# Patient Record
Sex: MALE | Race: WHITE | NOT HISPANIC OR LATINO | Employment: FULL TIME | URBAN - NONMETROPOLITAN AREA
[De-identification: names, ages, dates, MRNs, and addresses within clinical notes are randomized per-mention and may not be internally consistent; named-entity substitution may affect disease eponyms.]

---

## 2023-04-20 PROBLEM — R09.81 NASAL CONGESTION: Status: ACTIVE | Noted: 2023-04-20

## 2023-04-20 PROBLEM — M79.10 MYALGIA: Status: ACTIVE | Noted: 2023-04-20

## 2023-04-20 PROBLEM — M79.641 PAIN OF RIGHT HAND: Status: ACTIVE | Noted: 2023-04-20

## 2023-04-20 PROBLEM — R35.1 NOCTURIA: Status: ACTIVE | Noted: 2023-04-20

## 2023-04-20 PROBLEM — R19.5 POSITIVE COLORECTAL CANCER SCREENING USING COLOGUARD TEST: Status: ACTIVE | Noted: 2023-04-20

## 2023-04-20 PROBLEM — R20.0 NUMBNESS: Status: ACTIVE | Noted: 2023-04-20

## 2023-04-20 PROBLEM — K21.9 GERD (GASTROESOPHAGEAL REFLUX DISEASE): Status: ACTIVE | Noted: 2023-04-20

## 2023-04-20 PROBLEM — R53.83 FATIGUE: Status: ACTIVE | Noted: 2023-04-20

## 2023-04-20 PROBLEM — G47.33 OBSTRUCTIVE SLEEP APNEA: Status: ACTIVE | Noted: 2023-04-20

## 2023-04-20 PROBLEM — M79.641 PAIN IN BOTH HANDS: Status: ACTIVE | Noted: 2023-04-20

## 2023-04-20 PROBLEM — E78.2 HYPERLIPEMIA, MIXED: Status: ACTIVE | Noted: 2023-04-20

## 2023-04-20 PROBLEM — M79.642 PAIN IN BOTH HANDS: Status: ACTIVE | Noted: 2023-04-20

## 2023-04-20 PROBLEM — H69.93 DYSFUNCTION OF BOTH EUSTACHIAN TUBES: Status: ACTIVE | Noted: 2023-04-20

## 2023-04-20 PROBLEM — R91.8 LUNG NODULES: Status: ACTIVE | Noted: 2023-04-20

## 2023-04-20 RX ORDER — MULTIVITAMIN
1 TABLET ORAL DAILY
COMMUNITY
Start: 2022-03-22

## 2023-04-20 RX ORDER — PANTOPRAZOLE SODIUM 40 MG/1
1 TABLET, DELAYED RELEASE ORAL DAILY
COMMUNITY
Start: 2022-03-22 | End: 2023-06-12 | Stop reason: SDUPTHER

## 2023-04-20 RX ORDER — FLUTICASONE PROPIONATE 50 MCG
2 SPRAY, SUSPENSION (ML) NASAL DAILY
COMMUNITY
Start: 2022-06-27 | End: 2024-02-19 | Stop reason: WASHOUT

## 2023-04-21 ENCOUNTER — TELEPHONE (OUTPATIENT)
Dept: PRIMARY CARE | Facility: CLINIC | Age: 53
End: 2023-04-21

## 2023-04-21 ENCOUNTER — LAB (OUTPATIENT)
Dept: LAB | Facility: LAB | Age: 53
End: 2023-04-21
Payer: COMMERCIAL

## 2023-04-21 ENCOUNTER — OFFICE VISIT (OUTPATIENT)
Dept: PRIMARY CARE | Facility: CLINIC | Age: 53
End: 2023-04-21
Payer: COMMERCIAL

## 2023-04-21 VITALS
WEIGHT: 207.4 LBS | HEIGHT: 74 IN | DIASTOLIC BLOOD PRESSURE: 80 MMHG | OXYGEN SATURATION: 95 % | HEART RATE: 84 BPM | SYSTOLIC BLOOD PRESSURE: 110 MMHG | BODY MASS INDEX: 26.62 KG/M2

## 2023-04-21 DIAGNOSIS — R53.83 FATIGUE, UNSPECIFIED TYPE: ICD-10-CM

## 2023-04-21 DIAGNOSIS — R00.2 PALPITATIONS: ICD-10-CM

## 2023-04-21 DIAGNOSIS — K21.9 GASTROESOPHAGEAL REFLUX DISEASE WITHOUT ESOPHAGITIS: Primary | ICD-10-CM

## 2023-04-21 DIAGNOSIS — Z12.5 SCREENING FOR PROSTATE CANCER: ICD-10-CM

## 2023-04-21 DIAGNOSIS — R07.9 CHEST PAIN, UNSPECIFIED TYPE: ICD-10-CM

## 2023-04-21 DIAGNOSIS — G47.33 OBSTRUCTIVE SLEEP APNEA: ICD-10-CM

## 2023-04-21 DIAGNOSIS — E78.2 HYPERLIPEMIA, MIXED: ICD-10-CM

## 2023-04-21 PROBLEM — R20.0 NUMBNESS: Status: RESOLVED | Noted: 2023-04-20 | Resolved: 2023-04-21

## 2023-04-21 PROBLEM — M79.10 MYALGIA: Status: RESOLVED | Noted: 2023-04-20 | Resolved: 2023-04-21

## 2023-04-21 PROBLEM — M79.641 PAIN IN BOTH HANDS: Status: RESOLVED | Noted: 2023-04-20 | Resolved: 2023-04-21

## 2023-04-21 PROBLEM — M79.641 PAIN OF RIGHT HAND: Status: RESOLVED | Noted: 2023-04-20 | Resolved: 2023-04-21

## 2023-04-21 PROBLEM — M79.642 PAIN IN BOTH HANDS: Status: RESOLVED | Noted: 2023-04-20 | Resolved: 2023-04-21

## 2023-04-21 LAB
ALANINE AMINOTRANSFERASE (SGPT) (U/L) IN SER/PLAS: 18 U/L (ref 10–52)
ALBUMIN (G/DL) IN SER/PLAS: 4.5 G/DL (ref 3.4–5)
ALKALINE PHOSPHATASE (U/L) IN SER/PLAS: 85 U/L (ref 33–120)
ANION GAP IN SER/PLAS: 12 MMOL/L (ref 10–20)
ASPARTATE AMINOTRANSFERASE (SGOT) (U/L) IN SER/PLAS: 18 U/L (ref 9–39)
BILIRUBIN TOTAL (MG/DL) IN SER/PLAS: 0.9 MG/DL (ref 0–1.2)
CALCIUM (MG/DL) IN SER/PLAS: 9.3 MG/DL (ref 8.6–10.3)
CARBON DIOXIDE, TOTAL (MMOL/L) IN SER/PLAS: 25 MMOL/L (ref 21–32)
CHLORIDE (MMOL/L) IN SER/PLAS: 107 MMOL/L (ref 98–107)
CHOLESTEROL (MG/DL) IN SER/PLAS: 191 MG/DL (ref 0–199)
CHOLESTEROL IN HDL (MG/DL) IN SER/PLAS: 44 MG/DL
CHOLESTEROL/HDL RATIO: 4.3
COBALAMIN (VITAMIN B12) (PG/ML) IN SER/PLAS: 476 PG/ML (ref 211–911)
CREATININE (MG/DL) IN SER/PLAS: 1.06 MG/DL (ref 0.5–1.3)
ERYTHROCYTE DISTRIBUTION WIDTH (RATIO) BY AUTOMATED COUNT: 12.5 % (ref 11.5–14.5)
ERYTHROCYTE MEAN CORPUSCULAR HEMOGLOBIN CONCENTRATION (G/DL) BY AUTOMATED: 34.2 G/DL (ref 32–36)
ERYTHROCYTE MEAN CORPUSCULAR VOLUME (FL) BY AUTOMATED COUNT: 90 FL (ref 80–100)
ERYTHROCYTES (10*6/UL) IN BLOOD BY AUTOMATED COUNT: 5.32 X10E12/L (ref 4.5–5.9)
GFR MALE: 84 ML/MIN/1.73M2
GLUCOSE (MG/DL) IN SER/PLAS: 103 MG/DL (ref 74–99)
HEMATOCRIT (%) IN BLOOD BY AUTOMATED COUNT: 48 % (ref 41–52)
HEMOGLOBIN (G/DL) IN BLOOD: 16.4 G/DL (ref 13.5–17.5)
LDL: 128 MG/DL (ref 0–99)
LEUKOCYTES (10*3/UL) IN BLOOD BY AUTOMATED COUNT: 5.1 X10E9/L (ref 4.4–11.3)
PLATELETS (10*3/UL) IN BLOOD AUTOMATED COUNT: 216 X10E9/L (ref 150–450)
POTASSIUM (MMOL/L) IN SER/PLAS: 4.2 MMOL/L (ref 3.5–5.3)
PROSTATE SPECIFIC ANTIGEN,SCREEN: 0.86 NG/ML (ref 0–4)
PROTEIN TOTAL: 7.1 G/DL (ref 6.4–8.2)
SODIUM (MMOL/L) IN SER/PLAS: 140 MMOL/L (ref 136–145)
THYROTROPIN (MIU/L) IN SER/PLAS BY DETECTION LIMIT <= 0.05 MIU/L: 1.97 MIU/L (ref 0.44–3.98)
TRIGLYCERIDE (MG/DL) IN SER/PLAS: 96 MG/DL (ref 0–149)
UREA NITROGEN (MG/DL) IN SER/PLAS: 20 MG/DL (ref 6–23)
VLDL: 19 MG/DL (ref 0–40)

## 2023-04-21 PROCEDURE — 80053 COMPREHEN METABOLIC PANEL: CPT

## 2023-04-21 PROCEDURE — 84153 ASSAY OF PSA TOTAL: CPT

## 2023-04-21 PROCEDURE — 85027 COMPLETE CBC AUTOMATED: CPT

## 2023-04-21 PROCEDURE — 99214 OFFICE O/P EST MOD 30 MIN: CPT | Performed by: FAMILY MEDICINE

## 2023-04-21 PROCEDURE — 80061 LIPID PANEL: CPT

## 2023-04-21 PROCEDURE — 36415 COLL VENOUS BLD VENIPUNCTURE: CPT

## 2023-04-21 PROCEDURE — 82607 VITAMIN B-12: CPT

## 2023-04-21 PROCEDURE — 84443 ASSAY THYROID STIM HORMONE: CPT

## 2023-04-21 ASSESSMENT — ENCOUNTER SYMPTOMS
VOMITING: 0
ABDOMINAL DISTENTION: 0
SHORTNESS OF BREATH: 0
ACTIVITY CHANGE: 0
PALPITATIONS: 1
FATIGUE: 1
ARTHRALGIAS: 0
ADENOPATHY: 0
APPETITE CHANGE: 0
WHEEZING: 0
NUMBNESS: 0
ABDOMINAL PAIN: 0
HEADACHES: 0
UNEXPECTED WEIGHT CHANGE: 0
DIZZINESS: 0
RHINORRHEA: 0
CHEST TIGHTNESS: 1
NAUSEA: 0
DIARRHEA: 0
TROUBLE SWALLOWING: 0
DIFFICULTY URINATING: 0
COUGH: 0
CONSTIPATION: 0
LIGHT-HEADEDNESS: 1

## 2023-04-21 NOTE — ASSESSMENT & PLAN NOTE
Pulse rate is normal today, noticing intermittent palpitations, will check a stress test given the patient's intermittent chest pressure and lightheadedness and decreased exercise tolerance.  Also check blood testing today.

## 2023-04-21 NOTE — ASSESSMENT & PLAN NOTE
Atypical chest pain, patient is very concerned about cardiac risk, CT cardiac calcium score done a few years ago was 0, patient is concerned about intermittent blood pressure readings being elevated, and change in exercise tolerance, increasing fatigue, intermittent shortness of breath and lightheadedness.  We will check a treadmill Cardiolite, check blood testing today, recheck in the next month, contact the office if symptoms worsen.

## 2023-04-21 NOTE — PROGRESS NOTES
"Subjective   Patient ID: Smith Gray II is a 53 y.o. male who presents for Hypertension.    HPI   Taking PPI daily without breakthrough symptoms.  Reviewed dietary, caffeine, tobacco, alcohol, and NSAID use.  No dyspepsia, dysphagia, reflux, melena, or abdominal pain.  Pressure in ears and eyes at times  No headache, chest pain (has had some pressure in chest), shortness of breath, dizziness, lightheadedness (at times has noticed some edema)  One and a half week more pressure in chest, some fluttering in chest at times, some change in exercise tolerance  Checked BP at [facility] in the past week, noticed elevated BP  Feels pulse in neck and pressure behind eyes  Has checked some home BPs (below 140/90)  Patient has been using their CPAP nightly and is experiencing restful sleep, no morning headaches, no witnessed snoring, and notices a difference if they do not use the device.      Review of Systems   Constitutional:  Positive for fatigue. Negative for activity change, appetite change and unexpected weight change.   HENT:  Negative for ear pain, nosebleeds, rhinorrhea, sneezing and trouble swallowing.    Respiratory:  Positive for chest tightness. Negative for cough, shortness of breath and wheezing.    Cardiovascular:  Positive for chest pain and palpitations. Negative for leg swelling.   Gastrointestinal:  Negative for abdominal distention, abdominal pain, constipation, diarrhea, nausea and vomiting.   Genitourinary:  Negative for difficulty urinating.   Musculoskeletal:  Negative for arthralgias.   Skin:  Negative for rash.   Neurological:  Positive for light-headedness. Negative for dizziness, numbness and headaches.   Hematological:  Negative for adenopathy.   Psychiatric/Behavioral:  Negative for behavioral problems.    All other systems reviewed and are negative.      Objective   /80   Pulse 84   Ht 1.88 m (6' 2\")   Wt 94.1 kg (207 lb 6.4 oz)   SpO2 95%   BMI 26.63 kg/m²     Physical Exam  Vitals " and nursing note reviewed.   Constitutional:       General: He is not in acute distress.     Appearance: Normal appearance. He is not toxic-appearing.   HENT:      Head: Normocephalic and atraumatic.      Right Ear: Tympanic membrane, ear canal and external ear normal.      Left Ear: Tympanic membrane, ear canal and external ear normal.      Nose: Nose normal.      Mouth/Throat:      Mouth: Mucous membranes are moist.      Pharynx: Oropharynx is clear.   Eyes:      Extraocular Movements: Extraocular movements intact.      Conjunctiva/sclera: Conjunctivae normal.      Pupils: Pupils are equal, round, and reactive to light.   Cardiovascular:      Rate and Rhythm: Normal rate and regular rhythm.   Pulmonary:      Effort: Pulmonary effort is normal.      Breath sounds: Normal breath sounds.   Abdominal:      General: Abdomen is flat. Bowel sounds are normal.      Palpations: Abdomen is soft.   Musculoskeletal:      Cervical back: Normal range of motion and neck supple.   Skin:     General: Skin is warm and dry.      Capillary Refill: Capillary refill takes less than 2 seconds.   Neurological:      General: No focal deficit present.      Mental Status: He is alert and oriented to person, place, and time. Mental status is at baseline.   Psychiatric:         Mood and Affect: Mood normal.         Behavior: Behavior normal.         Assessment/Plan   Problem List Items Addressed This Visit          Nervous    Obstructive sleep apnea     Compliant with CPAP therapy.            Circulatory    Palpitations     Pulse rate is normal today, noticing intermittent palpitations, will check a stress test given the patient's intermittent chest pressure and lightheadedness and decreased exercise tolerance.  Also check blood testing today.         Relevant Orders    CBC    Comprehensive Metabolic Panel    Thyroid Stimulating Hormone    Nuclear Stress Test       Digestive    GERD (gastroesophageal reflux disease) - Primary     Currently  stable without symptoms on medication.            Other    Fatigue     Check blood testing today.         Relevant Orders    Follow Up In Primary Care    CBC    Comprehensive Metabolic Panel    Thyroid Stimulating Hormone    Vitamin B12    Hyperlipemia, mixed     Advised about diabetic blood testing today.         Relevant Orders    Comprehensive Metabolic Panel    Lipid Panel    Chest pain     Atypical chest pain, patient is very concerned about cardiac risk, CT cardiac calcium score done a few years ago was 0, patient is concerned about intermittent blood pressure readings being elevated, and change in exercise tolerance, increasing fatigue, intermittent shortness of breath and lightheadedness.  We will check a treadmill Cardiolite, check blood testing today, recheck in the next month, contact the office if symptoms worsen.         Relevant Orders    CBC    Comprehensive Metabolic Panel    Nuclear Stress Test     Other Visit Diagnoses       Screening for prostate cancer        Relevant Orders    Prostate Specific Antigen, Screen

## 2023-04-21 NOTE — PATIENT INSTRUCTIONS
Home blood pressure goal is less than 140/90, check a couple times a week, be rested for 20 minutes before checking

## 2023-04-24 ENCOUNTER — DOCUMENTATION (OUTPATIENT)
Dept: PRIMARY CARE | Facility: CLINIC | Age: 53
End: 2023-04-24
Payer: COMMERCIAL

## 2023-05-01 ENCOUNTER — TELEPHONE (OUTPATIENT)
Dept: PRIMARY CARE | Facility: CLINIC | Age: 53
End: 2023-05-01
Payer: COMMERCIAL

## 2023-05-01 DIAGNOSIS — R94.39 ABNORMAL CARDIOVASCULAR STRESS TEST: Primary | ICD-10-CM

## 2023-05-01 NOTE — TELEPHONE ENCOUNTER
SCHEDULED PATIENT THRU [facility] FOR WED 5/24/23 AT 8:45 FOR CARDIOLOGY.  PATIENT GIVEN PREAPPT. INSTRUCTIONS.

## 2023-05-22 ENCOUNTER — APPOINTMENT (OUTPATIENT)
Dept: PRIMARY CARE | Facility: CLINIC | Age: 53
End: 2023-05-22
Payer: COMMERCIAL

## 2023-06-12 ENCOUNTER — OFFICE VISIT (OUTPATIENT)
Dept: PRIMARY CARE | Facility: CLINIC | Age: 53
End: 2023-06-12
Payer: COMMERCIAL

## 2023-06-12 VITALS
HEIGHT: 74 IN | WEIGHT: 210.6 LBS | HEART RATE: 69 BPM | OXYGEN SATURATION: 98 % | BODY MASS INDEX: 27.03 KG/M2 | DIASTOLIC BLOOD PRESSURE: 80 MMHG | SYSTOLIC BLOOD PRESSURE: 130 MMHG

## 2023-06-12 DIAGNOSIS — Z12.5 PROSTATE CANCER SCREENING: ICD-10-CM

## 2023-06-12 DIAGNOSIS — I71.20 THORACIC AORTIC ANEURYSM WITHOUT RUPTURE, UNSPECIFIED PART (CMS-HCC): ICD-10-CM

## 2023-06-12 DIAGNOSIS — E78.2 HYPERLIPEMIA, MIXED: ICD-10-CM

## 2023-06-12 DIAGNOSIS — R53.83 FATIGUE, UNSPECIFIED TYPE: ICD-10-CM

## 2023-06-12 DIAGNOSIS — K21.9 GASTROESOPHAGEAL REFLUX DISEASE WITHOUT ESOPHAGITIS: ICD-10-CM

## 2023-06-12 DIAGNOSIS — R00.2 PALPITATIONS: Primary | ICD-10-CM

## 2023-06-12 DIAGNOSIS — R91.8 LUNG NODULES: ICD-10-CM

## 2023-06-12 PROCEDURE — 99396 PREV VISIT EST AGE 40-64: CPT | Performed by: FAMILY MEDICINE

## 2023-06-12 RX ORDER — PANTOPRAZOLE SODIUM 40 MG/1
40 TABLET, DELAYED RELEASE ORAL DAILY
Qty: 90 TABLET | Refills: 3 | Status: SHIPPED | OUTPATIENT
Start: 2023-06-12 | End: 2024-06-11

## 2023-06-12 ASSESSMENT — ENCOUNTER SYMPTOMS
VOMITING: 0
RHINORRHEA: 0
ARTHRALGIAS: 0
ADENOPATHY: 0
CONSTIPATION: 0
ACTIVITY CHANGE: 0
UNEXPECTED WEIGHT CHANGE: 0
ABDOMINAL DISTENTION: 0
FATIGUE: 0
NAUSEA: 0
TROUBLE SWALLOWING: 0
PALPITATIONS: 0
LIGHT-HEADEDNESS: 0
NUMBNESS: 0
ABDOMINAL PAIN: 0
DIZZINESS: 0
WHEEZING: 0
HEADACHES: 0
APPETITE CHANGE: 0
COUGH: 0
DIARRHEA: 0
SHORTNESS OF BREATH: 0
DIFFICULTY URINATING: 0

## 2023-06-12 NOTE — ASSESSMENT & PLAN NOTE
Not really bothering the patient anymore, normal stress testing echocardiogram, follow-up as needed.

## 2023-06-12 NOTE — ASSESSMENT & PLAN NOTE
Found on most recent CT scan that was ordered by cardiology, echocardiogram done recently reveals a 3.2 cm aneurysm of the ascending aorta, CT scan done over a year ago shows similar finding.  We will plan to recheck this again in 1 year, patient is currently asymptomatic.

## 2023-06-12 NOTE — PROGRESS NOTES
"Subjective   Patient ID: Speedy Gray II is a 53 y.o. male who presents for Annual Exam.    HPI   No headache, chest pain, shortness of breath, dizziness, lightheadedness, or edema  Taking PPI daily without breakthrough symptoms.  Reviewed dietary, caffeine, tobacco, alcohol, and NSAID use.  No dyspepsia, dysphagia, reflux, melena, or abdominal pain.  No palpitations (can come and go over a rare period)  HBP less than 140/90  + active and exercise regularly      Review of Systems   Constitutional:  Negative for activity change, appetite change, fatigue and unexpected weight change.   HENT:  Negative for ear pain, nosebleeds, rhinorrhea, sneezing and trouble swallowing.    Respiratory:  Negative for cough, shortness of breath and wheezing.    Cardiovascular:  Negative for chest pain, palpitations and leg swelling.   Gastrointestinal:  Negative for abdominal distention, abdominal pain, constipation, diarrhea, nausea and vomiting.   Genitourinary:  Negative for difficulty urinating.   Musculoskeletal:  Negative for arthralgias.   Skin:  Negative for rash.   Neurological:  Negative for dizziness, light-headedness, numbness and headaches.   Hematological:  Negative for adenopathy.   Psychiatric/Behavioral:  Negative for behavioral problems.    All other systems reviewed and are negative.      Objective   /80   Pulse 69   Ht 1.88 m (6' 2\")   Wt 95.5 kg (210 lb 9.6 oz)   SpO2 98%   BMI 27.04 kg/m²     Physical Exam  Vitals and nursing note reviewed.   Constitutional:       General: He is not in acute distress.     Appearance: Normal appearance. He is not toxic-appearing.   HENT:      Head: Normocephalic and atraumatic.      Right Ear: Tympanic membrane, ear canal and external ear normal.      Left Ear: Tympanic membrane, ear canal and external ear normal.      Nose: Nose normal.      Mouth/Throat:      Mouth: Mucous membranes are moist.      Pharynx: Oropharynx is clear.   Eyes:      Extraocular Movements: " Extraocular movements intact.      Conjunctiva/sclera: Conjunctivae normal.      Pupils: Pupils are equal, round, and reactive to light.   Cardiovascular:      Rate and Rhythm: Normal rate and regular rhythm.   Pulmonary:      Effort: Pulmonary effort is normal.      Breath sounds: Normal breath sounds.   Abdominal:      General: Abdomen is flat. Bowel sounds are normal.      Palpations: Abdomen is soft.   Musculoskeletal:      Cervical back: Normal range of motion and neck supple.   Skin:     General: Skin is warm and dry.      Capillary Refill: Capillary refill takes less than 2 seconds.   Neurological:      General: No focal deficit present.      Mental Status: He is alert and oriented to person, place, and time. Mental status is at baseline.   Psychiatric:         Mood and Affect: Mood normal.         Behavior: Behavior normal.         Assessment/Plan   Problem List Items Addressed This Visit          Respiratory    Lung nodules     Nodules are stable for the past 3 years, most likely benign.            Circulatory    Palpitations - Primary     Not really bothering the patient anymore, normal stress testing echocardiogram, follow-up as needed.         Relevant Orders    Follow Up In Primary Care    Thoracic aortic aneurysm without rupture (CMS/HCC)     Found on most recent CT scan that was ordered by cardiology, echocardiogram done recently reveals a 3.2 cm aneurysm of the ascending aorta, CT scan done over a year ago shows similar finding.  We will plan to recheck this again in 1 year, patient is currently asymptomatic.            Digestive    GERD (gastroesophageal reflux disease)     Currently stable no change.         Relevant Medications    pantoprazole (ProtoNix) 40 mg EC tablet    Other Relevant Orders    Follow Up In Primary Care       Other    Fatigue    Relevant Orders    Follow Up In Primary Care    Hyperlipemia, mixed     Mild elevation in LDL cholesterol, CT cardiac calcium score ordered by  cardiology reveals a score in the LAD of only 3.  Patient very active, advised again about diet and exercise, no change with medication follow-up again in 1 year.         Relevant Orders    Follow Up In Primary Care    Comprehensive Metabolic Panel    Lipid Panel     Other Visit Diagnoses       Prostate cancer screening        Relevant Orders    Follow Up In Primary Care    Prostate Specific Antigen, Screen

## 2023-06-12 NOTE — ASSESSMENT & PLAN NOTE
Mild elevation in LDL cholesterol, CT cardiac calcium score ordered by cardiology reveals a score in the LAD of only 3.  Patient very active, advised again about diet and exercise, no change with medication follow-up again in 1 year.

## 2023-10-31 ENCOUNTER — TELEPHONE (OUTPATIENT)
Dept: PRIMARY CARE | Facility: CLINIC | Age: 53
End: 2023-10-31

## 2023-10-31 ENCOUNTER — OFFICE VISIT (OUTPATIENT)
Dept: PRIMARY CARE | Facility: CLINIC | Age: 53
End: 2023-10-31
Payer: COMMERCIAL

## 2023-10-31 ENCOUNTER — LAB (OUTPATIENT)
Dept: LAB | Facility: LAB | Age: 53
End: 2023-10-31
Payer: COMMERCIAL

## 2023-10-31 VITALS
OXYGEN SATURATION: 99 % | BODY MASS INDEX: 27.58 KG/M2 | SYSTOLIC BLOOD PRESSURE: 110 MMHG | HEART RATE: 82 BPM | WEIGHT: 208.1 LBS | HEIGHT: 73 IN | DIASTOLIC BLOOD PRESSURE: 80 MMHG

## 2023-10-31 DIAGNOSIS — R51.9 NONINTRACTABLE HEADACHE, UNSPECIFIED CHRONICITY PATTERN, UNSPECIFIED HEADACHE TYPE: ICD-10-CM

## 2023-10-31 DIAGNOSIS — R42 DIZZINESS: ICD-10-CM

## 2023-10-31 DIAGNOSIS — R51.9 NONINTRACTABLE HEADACHE, UNSPECIFIED CHRONICITY PATTERN, UNSPECIFIED HEADACHE TYPE: Primary | ICD-10-CM

## 2023-10-31 LAB
ALBUMIN SERPL BCP-MCNC: 4.5 G/DL (ref 3.4–5)
ALP SERPL-CCNC: 76 U/L (ref 33–120)
ALT SERPL W P-5'-P-CCNC: 20 U/L (ref 10–52)
ANION GAP SERPL CALC-SCNC: 9 MMOL/L (ref 10–20)
AST SERPL W P-5'-P-CCNC: 19 U/L (ref 9–39)
BASOPHILS # BLD AUTO: 0.06 X10*3/UL (ref 0–0.1)
BASOPHILS NFR BLD AUTO: 1.1 %
BILIRUB SERPL-MCNC: 0.6 MG/DL (ref 0–1.2)
BUN SERPL-MCNC: 18 MG/DL (ref 6–23)
CALCIUM SERPL-MCNC: 9.3 MG/DL (ref 8.6–10.3)
CHLORIDE SERPL-SCNC: 104 MMOL/L (ref 98–107)
CO2 SERPL-SCNC: 30 MMOL/L (ref 21–32)
CREAT SERPL-MCNC: 0.99 MG/DL (ref 0.5–1.3)
CRP SERPL-MCNC: 0.22 MG/DL
EOSINOPHIL # BLD AUTO: 0.21 X10*3/UL (ref 0–0.7)
EOSINOPHIL NFR BLD AUTO: 3.9 %
ERYTHROCYTE [DISTWIDTH] IN BLOOD BY AUTOMATED COUNT: 12.5 % (ref 11.5–14.5)
ERYTHROCYTE [SEDIMENTATION RATE] IN BLOOD BY WESTERGREN METHOD: 3 MM/H (ref 0–20)
GFR SERPL CREATININE-BSD FRML MDRD: >90 ML/MIN/1.73M*2
GLUCOSE SERPL-MCNC: 99 MG/DL (ref 74–99)
HCT VFR BLD AUTO: 47.6 % (ref 41–52)
HGB BLD-MCNC: 16.2 G/DL (ref 13.5–17.5)
IMM GRANULOCYTES # BLD AUTO: 0.02 X10*3/UL (ref 0–0.7)
IMM GRANULOCYTES NFR BLD AUTO: 0.4 % (ref 0–0.9)
LYMPHOCYTES # BLD AUTO: 1.49 X10*3/UL (ref 1.2–4.8)
LYMPHOCYTES NFR BLD AUTO: 27.6 %
MCH RBC QN AUTO: 31.2 PG (ref 26–34)
MCHC RBC AUTO-ENTMCNC: 34 G/DL (ref 32–36)
MCV RBC AUTO: 92 FL (ref 80–100)
MONOCYTES # BLD AUTO: 0.46 X10*3/UL (ref 0.1–1)
MONOCYTES NFR BLD AUTO: 8.5 %
NEUTROPHILS # BLD AUTO: 3.15 X10*3/UL (ref 1.2–7.7)
NEUTROPHILS NFR BLD AUTO: 58.5 %
NRBC BLD-RTO: 0 /100 WBCS (ref 0–0)
PLATELET # BLD AUTO: 211 X10*3/UL (ref 150–450)
PMV BLD AUTO: 10.6 FL (ref 7.5–11.5)
POTASSIUM SERPL-SCNC: 4 MMOL/L (ref 3.5–5.3)
PROT SERPL-MCNC: 7.3 G/DL (ref 6.4–8.2)
RBC # BLD AUTO: 5.19 X10*6/UL (ref 4.5–5.9)
SODIUM SERPL-SCNC: 139 MMOL/L (ref 136–145)
WBC # BLD AUTO: 5.4 X10*3/UL (ref 4.4–11.3)

## 2023-10-31 PROCEDURE — 86140 C-REACTIVE PROTEIN: CPT

## 2023-10-31 PROCEDURE — 99213 OFFICE O/P EST LOW 20 MIN: CPT | Performed by: FAMILY MEDICINE

## 2023-10-31 PROCEDURE — 80053 COMPREHEN METABOLIC PANEL: CPT

## 2023-10-31 PROCEDURE — 85652 RBC SED RATE AUTOMATED: CPT

## 2023-10-31 PROCEDURE — 85025 COMPLETE CBC W/AUTO DIFF WBC: CPT

## 2023-10-31 PROCEDURE — 36415 COLL VENOUS BLD VENIPUNCTURE: CPT

## 2023-10-31 ASSESSMENT — ENCOUNTER SYMPTOMS
DIZZINESS: 1
ABDOMINAL DISTENTION: 0
CHILLS: 0
PALPITATIONS: 0
HEADACHES: 1
LIGHT-HEADEDNESS: 1
RHINORRHEA: 0
SPEECH DIFFICULTY: 0
NUMBNESS: 0
ARTHRALGIAS: 0
COUGH: 0
DIARRHEA: 0
SLEEP DISTURBANCE: 1
CONSTIPATION: 0
FEVER: 0
DIFFICULTY URINATING: 0
DECREASED CONCENTRATION: 0
VOMITING: 0
ACTIVITY CHANGE: 0
DYSPHORIC MOOD: 0
UNEXPECTED WEIGHT CHANGE: 0
WHEEZING: 0
WEAKNESS: 0
FATIGUE: 0
ABDOMINAL PAIN: 0
TROUBLE SWALLOWING: 0
ADENOPATHY: 0
SHORTNESS OF BREATH: 0
NERVOUS/ANXIOUS: 0
APPETITE CHANGE: 0
NAUSEA: 0

## 2023-10-31 NOTE — PROGRESS NOTES
"Subjective   Patient ID: Speedy Gray II is a 53 y.o. male who presents for Lt Headedness, Mild MILLS.    HPI   4 weeks ago noticed on a trip to [place], had a head rush, felt like might pass out, did not pass out  Has had headaches since, not disabling, radiates to ears, comes and goes, worse if sedentary, no worse with activity or exertion, hard to focus vision at times, LH at times, no issues with aphasia, some trouble focusing, no dizziness, hard to fall asleep at times, no change in emotions  More fatigued and tired than normal    Review of Systems   Constitutional:  Negative for activity change, appetite change, chills, fatigue, fever and unexpected weight change.   HENT:  Negative for ear pain, nosebleeds, rhinorrhea, sneezing and trouble swallowing.    Respiratory:  Negative for cough, shortness of breath and wheezing.    Cardiovascular:  Negative for chest pain, palpitations and leg swelling.   Gastrointestinal:  Negative for abdominal distention, abdominal pain, constipation, diarrhea, nausea and vomiting.   Genitourinary:  Negative for difficulty urinating.   Musculoskeletal:  Negative for arthralgias.   Skin:  Negative for rash.   Neurological:  Positive for dizziness, light-headedness and headaches. Negative for syncope, speech difficulty, weakness and numbness.   Hematological:  Negative for adenopathy.   Psychiatric/Behavioral:  Positive for sleep disturbance. Negative for behavioral problems, decreased concentration and dysphoric mood. The patient is not nervous/anxious.    All other systems reviewed and are negative.      Objective   /80   Pulse 82   Ht 1.854 m (6' 1\")   Wt 94.4 kg (208 lb 1.6 oz)   SpO2 99%   BMI 27.46 kg/m²     Physical Exam  Vitals and nursing note reviewed.   Constitutional:       General: He is not in acute distress.     Appearance: Normal appearance. He is not toxic-appearing.   HENT:      Head: Normocephalic and atraumatic.      Right Ear: Tympanic membrane, ear canal " and external ear normal.      Left Ear: Tympanic membrane, ear canal and external ear normal.      Nose: Nose normal.      Mouth/Throat:      Mouth: Mucous membranes are moist.      Pharynx: Oropharynx is clear.   Eyes:      Extraocular Movements: Extraocular movements intact.      Conjunctiva/sclera: Conjunctivae normal.      Pupils: Pupils are equal, round, and reactive to light.   Cardiovascular:      Rate and Rhythm: Normal rate and regular rhythm.      Pulses: Normal pulses.      Heart sounds: Normal heart sounds.   Pulmonary:      Effort: Pulmonary effort is normal.      Breath sounds: Normal breath sounds.   Abdominal:      General: Abdomen is flat. Bowel sounds are normal.      Palpations: Abdomen is soft.   Musculoskeletal:      Cervical back: Normal range of motion and neck supple.   Skin:     General: Skin is warm and dry.      Capillary Refill: Capillary refill takes less than 2 seconds.   Neurological:      General: No focal deficit present.      Mental Status: He is alert and oriented to person, place, and time. Mental status is at baseline.      Cranial Nerves: No cranial nerve deficit.      Sensory: No sensory deficit.      Motor: No weakness.      Coordination: Coordination normal.      Gait: Gait normal.      Deep Tendon Reflexes: Reflexes normal.   Psychiatric:         Mood and Affect: Mood normal.         Behavior: Behavior normal.         Assessment/Plan   Problem List Items Addressed This Visit    None  Visit Diagnoses         Codes    Nonintractable headache, unspecified chronicity pattern, unspecified headache type    -  Primary R51.9    Check labs and CT of the head.     Relevant Orders    Follow Up In Primary Care - Established    CBC and Auto Differential    Comprehensive Metabolic Panel    Sedimentation Rate    C-Reactive Protein    CT head wo IV contrast    Dizziness     R42    Start by checking labs, recheck in 2 weeks.     Relevant Orders    Follow Up In Primary Care - Established     CBC and Auto Differential    Comprehensive Metabolic Panel    Sedimentation Rate    C-Reactive Protein    CT head wo IV contrast

## 2023-11-08 ENCOUNTER — HOSPITAL ENCOUNTER (OUTPATIENT)
Dept: RADIOLOGY | Facility: HOSPITAL | Age: 53
Discharge: HOME | End: 2023-11-08
Payer: COMMERCIAL

## 2023-11-08 DIAGNOSIS — R51.9 NONINTRACTABLE HEADACHE, UNSPECIFIED CHRONICITY PATTERN, UNSPECIFIED HEADACHE TYPE: ICD-10-CM

## 2023-11-08 DIAGNOSIS — R42 DIZZINESS: ICD-10-CM

## 2023-11-08 PROCEDURE — 70450 CT HEAD/BRAIN W/O DYE: CPT | Performed by: STUDENT IN AN ORGANIZED HEALTH CARE EDUCATION/TRAINING PROGRAM

## 2023-11-08 PROCEDURE — 70450 CT HEAD/BRAIN W/O DYE: CPT

## 2023-11-16 ENCOUNTER — OFFICE VISIT (OUTPATIENT)
Dept: PRIMARY CARE | Facility: CLINIC | Age: 53
End: 2023-11-16
Payer: COMMERCIAL

## 2023-11-16 ENCOUNTER — TELEPHONE (OUTPATIENT)
Dept: PRIMARY CARE | Facility: CLINIC | Age: 53
End: 2023-11-16

## 2023-11-16 VITALS
HEART RATE: 73 BPM | OXYGEN SATURATION: 98 % | HEIGHT: 73 IN | BODY MASS INDEX: 27.67 KG/M2 | SYSTOLIC BLOOD PRESSURE: 110 MMHG | WEIGHT: 208.8 LBS | DIASTOLIC BLOOD PRESSURE: 80 MMHG

## 2023-11-16 DIAGNOSIS — R51.9 NONINTRACTABLE HEADACHE, UNSPECIFIED CHRONICITY PATTERN, UNSPECIFIED HEADACHE TYPE: Primary | ICD-10-CM

## 2023-11-16 DIAGNOSIS — R42 DIZZINESS: ICD-10-CM

## 2023-11-16 DIAGNOSIS — K21.9 GASTROESOPHAGEAL REFLUX DISEASE WITHOUT ESOPHAGITIS: ICD-10-CM

## 2023-11-16 PROCEDURE — 99213 OFFICE O/P EST LOW 20 MIN: CPT | Performed by: FAMILY MEDICINE

## 2023-11-16 RX ORDER — SERTRALINE HYDROCHLORIDE 25 MG/1
25 TABLET, FILM COATED ORAL DAILY
Qty: 30 TABLET | Refills: 11 | Status: SHIPPED | OUTPATIENT
Start: 2023-11-16 | End: 2024-05-30 | Stop reason: ALTCHOICE

## 2023-11-16 ASSESSMENT — ENCOUNTER SYMPTOMS
TROUBLE SWALLOWING: 0
WHEEZING: 0
NAUSEA: 0
NERVOUS/ANXIOUS: 1
HEADACHES: 1
DIZZINESS: 0
UNEXPECTED WEIGHT CHANGE: 0
LIGHT-HEADEDNESS: 1
ABDOMINAL DISTENTION: 0
DYSPHORIC MOOD: 1
FATIGUE: 1
DIFFICULTY URINATING: 0
COUGH: 0
ARTHRALGIAS: 0
ADENOPATHY: 0
VOMITING: 0
SHORTNESS OF BREATH: 0
NUMBNESS: 0
RHINORRHEA: 0
ABDOMINAL PAIN: 0
PALPITATIONS: 0
APPETITE CHANGE: 0
ACTIVITY CHANGE: 0
CONSTIPATION: 0
DIARRHEA: 0
SLEEP DISTURBANCE: 0

## 2023-11-16 NOTE — PROGRESS NOTES
"Subjective   Patient ID: Speedy Gray II is a 53 y.o. male who presents for 2 wk F/U.    HPI   Taking PPI daily without breakthrough symptoms.  Reviewed dietary, caffeine, tobacco, alcohol, and NSAID use.  No dyspepsia, dysphagia, reflux, melena, or abdominal pain.  Random headaches, pain in ears, face and posterior head at times  Sleeping OK, rested in AM, no daytime somnolence  Occ dizziness, gets a head rush at times  No palpitations  Dizziness lasts seconds to a minute, rush in head, no syncope or vertigo  No vision, smell or hearing issues  Uses some NSAIDS through the week  Has noticed new over the past few months, has not gotten worse    Review of Systems   Constitutional:  Positive for fatigue. Negative for activity change, appetite change and unexpected weight change.   HENT:  Negative for ear pain, nosebleeds, rhinorrhea, sneezing and trouble swallowing.    Respiratory:  Negative for cough, shortness of breath and wheezing.    Cardiovascular:  Negative for chest pain, palpitations and leg swelling.   Gastrointestinal:  Negative for abdominal distention, abdominal pain, constipation, diarrhea, nausea and vomiting.   Genitourinary:  Negative for difficulty urinating.   Musculoskeletal:  Negative for arthralgias.   Skin:  Negative for rash.   Neurological:  Positive for light-headedness and headaches. Negative for dizziness and numbness.   Hematological:  Negative for adenopathy.   Psychiatric/Behavioral:  Positive for dysphoric mood. Negative for behavioral problems and sleep disturbance. The patient is nervous/anxious.    All other systems reviewed and are negative.      Objective   /80   Pulse 73   Ht 1.854 m (6' 1\")   Wt 94.7 kg (208 lb 12.8 oz)   SpO2 98%   BMI 27.55 kg/m²     Physical Exam  Vitals and nursing note reviewed.   Constitutional:       Appearance: Normal appearance.   HENT:      Head: Normocephalic and atraumatic.      Right Ear: Tympanic membrane, ear canal and external ear " normal.      Left Ear: Tympanic membrane, ear canal and external ear normal.      Nose: Nose normal.      Mouth/Throat:      Mouth: Mucous membranes are moist.      Pharynx: Oropharynx is clear.   Cardiovascular:      Rate and Rhythm: Normal rate and regular rhythm.      Pulses: Normal pulses.      Heart sounds: Normal heart sounds.   Pulmonary:      Effort: Pulmonary effort is normal.      Breath sounds: Normal breath sounds.   Musculoskeletal:      Cervical back: Normal range of motion and neck supple.   Neurological:      General: No focal deficit present.      Mental Status: He is alert and oriented to person, place, and time. Mental status is at baseline.      Cranial Nerves: No cranial nerve deficit.      Sensory: No sensory deficit.      Motor: No weakness.      Coordination: Coordination normal.      Gait: Gait normal.      Deep Tendon Reflexes: Reflexes normal.   Psychiatric:         Mood and Affect: Mood normal.         Behavior: Behavior normal.         Assessment/Plan   Problem List Items Addressed This Visit             ICD-10-CM    GERD (gastroesophageal reflux disease) K21.9     Other Visit Diagnoses         Codes    Nonintractable headache, unspecified chronicity pattern, unspecified headache type    -  Primary R51.9    Consultation with neurology, will try low-dose sertraline 25 mg a day, contact the office if gets worse or changes.     Relevant Medications    sertraline (Zoloft) 25 mg tablet    Other Relevant Orders    Referral to Neurology    Dizziness     R42    Get neurology consultation.  May need MRI scan of the head     Relevant Medications    sertraline (Zoloft) 25 mg tablet    Other Relevant Orders    Referral to Neurology

## 2024-02-19 ENCOUNTER — OFFICE VISIT (OUTPATIENT)
Dept: NEUROLOGY | Facility: CLINIC | Age: 54
End: 2024-02-19
Payer: COMMERCIAL

## 2024-02-19 VITALS
WEIGHT: 206 LBS | SYSTOLIC BLOOD PRESSURE: 138 MMHG | HEIGHT: 73 IN | HEART RATE: 73 BPM | DIASTOLIC BLOOD PRESSURE: 84 MMHG | BODY MASS INDEX: 27.3 KG/M2

## 2024-02-19 DIAGNOSIS — R51.9 NEW ONSET OF HEADACHES AFTER AGE 50: Primary | ICD-10-CM

## 2024-02-19 DIAGNOSIS — R13.10 DYSPHAGIA, UNSPECIFIED TYPE: ICD-10-CM

## 2024-02-19 DIAGNOSIS — R51.9 NONINTRACTABLE HEADACHE, UNSPECIFIED CHRONICITY PATTERN, UNSPECIFIED HEADACHE TYPE: ICD-10-CM

## 2024-02-19 DIAGNOSIS — R42 DIZZINESS: ICD-10-CM

## 2024-02-19 PROCEDURE — 99204 OFFICE O/P NEW MOD 45 MIN: CPT | Performed by: PSYCHIATRY & NEUROLOGY

## 2024-02-19 ASSESSMENT — ENCOUNTER SYMPTOMS
POLYDIPSIA: 0
COUGH: 0
SHORTNESS OF BREATH: 0
WOUND: 0
DIAPHORESIS: 0
AGITATION: 0
ARTHRALGIAS: 0
EYE DISCHARGE: 0
DIARRHEA: 0
NERVOUS/ANXIOUS: 1
ADENOPATHY: 0
DIFFICULTY URINATING: 0
FEVER: 0
CONSTIPATION: 0
CHILLS: 0
FATIGUE: 0
BACK PAIN: 0

## 2024-02-19 ASSESSMENT — VISUAL ACUITY: VA_NORMAL: 1

## 2024-02-19 NOTE — PROGRESS NOTES
"Consults    History Of Present Illness  Smith Gray II \"Speedy\" is a 53 y.o. 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  His weight is stable.    Past Medical and Surgical History  Active Ambulatory Problems     Diagnosis Date Noted    Dysfunction of both eustachian tubes 04/20/2023    Fatigue 04/20/2023    GERD (gastroesophageal reflux disease) 04/20/2023    Hyperlipemia, mixed 04/20/2023    Lung nodules 04/20/2023    Nasal congestion 04/20/2023    Nocturia 04/20/2023    Obstructive sleep apnea 04/20/2023    Positive colorectal cancer screening using Cologuard test 04/20/2023    Chest pain 04/21/2023    Palpitations 04/21/2023    Thoracic aortic aneurysm without rupture (CMS/HCC) 06/12/2023     Resolved Ambulatory Problems     Diagnosis Date Noted    Myalgia 04/20/2023    Numbness 04/20/2023    " Pain in both hands 04/20/2023    Pain of right hand 04/20/2023     No Additional Past Medical History       Past Surgical History:   Procedure Laterality Date    OTHER SURGICAL HISTORY  12/21/2020    Back surgery    OTHER SURGICAL HISTORY  12/21/2020    Knee arthroscopy    OTHER SURGICAL HISTORY  12/21/2020    Interior tooth extraction    OTHER SURGICAL HISTORY  04/26/2022    Colonoscopy        Social History  Social History     Tobacco Use    Smoking status: Former     Types: Cigars    Smokeless tobacco: Never   Vaping Use    Vaping Use: Never used   Substance Use Topics    Alcohol use: Yes     Comment: occ    Drug use: Never     Allergies  Patient has no known allergies.  (Not in a hospital admission)      Review of Systems   Constitutional:  Negative for chills, diaphoresis, fatigue and fever.   HENT:  Negative for congestion.    Eyes:  Negative for discharge.   Respiratory:  Negative for cough and shortness of breath.    Cardiovascular:  Negative for chest pain.   Gastrointestinal:  Negative for constipation and diarrhea.   Endocrine: Negative for polydipsia.   Genitourinary:  Negative for difficulty urinating.   Musculoskeletal:  Negative for arthralgias and back pain.   Skin:  Negative for pallor and wound.   Allergic/Immunologic: Negative for environmental allergies.   Hematological:  Negative for adenopathy.   Psychiatric/Behavioral:  Negative for agitation and behavioral problems. The patient is nervous/anxious.          Cardiovascular system: S1-S2 intact  Respiratory clear to auscultation bilateral on deep breathing he feels irritability on the left side of the chest.    Neurological Exam  Mental Status  Awake, alert and oriented to person, place and time. Recent and remote memory are intact. Able to copy figure. Clock drawing is normal. Speech is normal. Able to name objects, name parts of objects, repeat, read and write. Follows three-step commands. Able to perform serial calculations. Able to spell words  "backwards. Fund of knowledge is appropriate for level of education.    Cranial Nerves  CN II: Visual acuity is normal. Right funduscopic exam: disc intact. Left funduscopic exam: disc intact.  CN III, IV, VI: Extraocular movements intact bilaterally. Normal lids and orbits bilaterally.   Right pupil: Round. Reactive to light. Reactive to accommodation.  CN V:  Right: Facial sensation is normal.  Left: Facial sensation is normal on the left.  CN VII: Full and symmetric facial movement.  CN VIII:  Right: Hearing is normal.  Left: Hearing is normal.  CN IX, X:  Right: Palate is normal.  Left: Palate is normal.  CN XI:  Right: Sternocleidomastoid strength is normal.  Left: Sternocleidomastoid strength is normal.  CN XII: Tongue midline without atrophy or fasciculations.    Motor  Normal muscle bulk throughout. No fasciculations present. Normal muscle tone. No abnormal involuntary movements. Strength is 5/5 throughout all four extremities.    Sensory  Light touch is normal in upper and lower extremities. Pinprick is normal in upper and lower extremities. Temperature is normal in upper and lower extremities. Vibration is normal in upper and lower extremities.     Reflexes  Deep tendon reflexes are 2+ and symmetric in all four extremities.  Jaw jerk absent.  Right pathological reflexes: Shivani's absent.  Left pathological reflexes: Shivani's absent.  Glabellar tap absent. Snout absent. Right palmomental absent. Left palmomental absent. Right palmar grasp absent. Left palmar grasp absent.    Coordination  Right: Finger-to-nose normal. Rapid alternating movement normal. Heel-to-shin normal.Left: Finger-to-nose normal. Rapid alternating movement normal. Heel-to-shin normal.    Gait  Normal casual, toe, heel and tandem gait.        Last Recorded Vitals  Blood pressure 138/84, pulse 73, height 1.854 m (6' 1\"), weight 93.4 kg (206 lb).    Relevant Results      Current Outpatient Medications:     multivitamin tablet, Take 1 " tablet by mouth once daily., Disp: , Rfl:     pantoprazole (ProtoNix) 40 mg EC tablet, Take 1 tablet (40 mg) by mouth once daily., Disp: 90 tablet, Rfl: 3    sertraline (Zoloft) 25 mg tablet, Take 1 tablet (25 mg) by mouth once daily. (Patient taking differently: Take 1 tablet (25 mg) by mouth if needed.), Disp: 30 tablet, Rfl: 11     Continuous medications    PRN medications, reviewed                                        I have personally reviewed the following imaging for brain (CT/ MR) and results and discussed in detail with the patient/care giver/family     No results found.     Imaging Brain/Head  No results found for this or any previous visit.      Imaging Cervical  No results found for this or any previous visit.    Imaging Thoracic  No results found for this or any previous visit.    Imaging Lumbar  No results found for this or any previous visit.      Assessment/Plan   Intermittent dysphagia rule out aspiration with barium swallow  Rule out myopathy CK aldolase, rule out myasthenia gravis acetylcholine receptor antibodies panel his son is diagnosed with ocular myasthenia and he did very well with prednisone and he has been asymptomatic and his son is in the teenage.    No conduction EMG testing to rule out myopathy or findings of ALS     Patient/Family Education: Extensive time was spent educating the patient on relevant anatomy, clinical findings and imaging, as well as discussing the potential diagnoses as discussed above.  Pharmacology: as above. Exercise: I discussed the importance of maintaining a daily exercise program, including stretching and strengthening. Preventative strategies were reviewed, specifically avoidance of any exercises that exacerbate pain.Return to online virtual visit/ clinic visit for follow-up with me in 2 months or sooner as needed.The patient expressed understanding and agreement with the assessment and plan.  Patient encouraged to contact us should they have any questions,  concerns, or any changes in symptoms. Thank you for allowing me to participate in the care of your patient.** This note is created using speech recognition transcription software. Despite proofreading, several typographical errors might be present that might affect the meaning of the content. Please call with any questions.**          Kiet Velazquez MD

## 2024-02-27 ENCOUNTER — HOSPITAL ENCOUNTER (OUTPATIENT)
Dept: NEUROLOGY | Facility: CLINIC | Age: 54
Discharge: HOME | End: 2024-02-27
Payer: COMMERCIAL

## 2024-02-27 DIAGNOSIS — R13.10 DYSPHAGIA, UNSPECIFIED TYPE: ICD-10-CM

## 2024-02-27 PROCEDURE — 95912 NRV CNDJ TEST 11-12 STUDIES: CPT | Performed by: PSYCHIATRY & NEUROLOGY

## 2024-02-27 PROCEDURE — 95886 MUSC TEST DONE W/N TEST COMP: CPT | Performed by: PSYCHIATRY & NEUROLOGY

## 2024-02-27 PROCEDURE — 95887 MUSC TST DONE W/N TST NONEXT: CPT | Performed by: PSYCHIATRY & NEUROLOGY

## 2024-02-27 PROCEDURE — 95887 MUSC TST DONE W/N TST NONEXT: CPT

## 2024-02-27 PROCEDURE — 95867 NDL EMG CRANIAL NRV MUSC UNI: CPT | Performed by: PSYCHIATRY & NEUROLOGY

## 2024-03-06 ENCOUNTER — HOSPITAL ENCOUNTER (OUTPATIENT)
Dept: RADIOLOGY | Facility: HOSPITAL | Age: 54
Discharge: HOME | End: 2024-03-06
Payer: COMMERCIAL

## 2024-03-06 DIAGNOSIS — R13.10 DYSPHAGIA, UNSPECIFIED TYPE: ICD-10-CM

## 2024-03-06 DIAGNOSIS — R51.9 NONINTRACTABLE HEADACHE, UNSPECIFIED CHRONICITY PATTERN, UNSPECIFIED HEADACHE TYPE: ICD-10-CM

## 2024-03-06 DIAGNOSIS — R51.9 NEW ONSET OF HEADACHES AFTER AGE 50: ICD-10-CM

## 2024-03-06 PROCEDURE — 70551 MRI BRAIN STEM W/O DYE: CPT

## 2024-03-06 PROCEDURE — 70551 MRI BRAIN STEM W/O DYE: CPT | Performed by: RADIOLOGY

## 2024-03-08 ENCOUNTER — HOSPITAL ENCOUNTER (OUTPATIENT)
Dept: RADIOLOGY | Facility: HOSPITAL | Age: 54
Discharge: HOME | End: 2024-03-08
Payer: COMMERCIAL

## 2024-03-08 DIAGNOSIS — R13.10 DYSPHAGIA, UNSPECIFIED TYPE: ICD-10-CM

## 2024-03-08 DIAGNOSIS — R13.19 ESOPHAGEAL DYSPHAGIA: Primary | ICD-10-CM

## 2024-03-08 PROCEDURE — 2500000001 HC RX 250 WO HCPCS SELF ADMINISTERED DRUGS (ALT 637 FOR MEDICARE OP): Performed by: PSYCHIATRY & NEUROLOGY

## 2024-03-08 PROCEDURE — 3430000001 HC RX 343 DIAGNOSTIC RADIOPHARMACEUTICALS: Performed by: PSYCHIATRY & NEUROLOGY

## 2024-03-08 PROCEDURE — 74230 X-RAY XM SWLNG FUNCJ C+: CPT | Performed by: RADIOLOGY

## 2024-03-08 PROCEDURE — 74230 X-RAY XM SWLNG FUNCJ C+: CPT

## 2024-03-08 PROCEDURE — 92611 MOTION FLUOROSCOPY/SWALLOW: CPT | Mod: GN | Performed by: SPEECH-LANGUAGE PATHOLOGIST

## 2024-03-08 RX ADMIN — BARIUM SULFATE 20 ML: 400 PASTE ORAL at 08:56

## 2024-03-08 RX ADMIN — BARIUM SULFATE 110 ML: 960 POWDER, FOR SUSPENSION ORAL at 08:54

## 2024-03-08 RX ADMIN — BARIUM SULFATE 10 ML: 400 SUSPENSION ORAL at 08:55

## 2024-03-08 RX ADMIN — BARIUM SULFATE 90 ML: 400 SUSPENSION ORAL at 08:54

## 2024-03-08 NOTE — PROCEDURES
"Speech-Language Pathology      Modified Barium Swallow Study     Patient Name: Smith Gray II \"Speedy\"  MRN: [id number]  : 1970  Today's Date: 24      Start Time: 8:30 am  End Time: 8:55 am  Total Time: 25 minutes    Recommendations:  Diet: Regular texture diet and thin liquids  Compensatory Strategies: Seated upright during intake and for 30 minutes after, alternate liquids and solids, moisten dry foods, small bites/sips, effortful swallow, double swallow with solids  Follow up: Consider GI follow up     Assessment/Impression:    Full detailed SLP/Radiologist Modified Barium Swallow study report can be found under Chart Review tab, Imaging tab and  titled \"[place] Modified Barium Swallow Study\"      Pt. Presenting with mild esophageal dysphagia noted by globus sensation, slowing of motility, residue collection near UES and LES, and esophageal residue with solids.    Plan:  SLP Plan: No treatment needs identified at this time     Discussed POC: Patient, Nursing   Discussed Risks/Benefits: Yes  Patient/Caregiver Agreeable: Yes    Pain:   Rating 0-10: 0  Location: NA     Education:   Pt. Educated on results of MBS study, recommended diet and recommended safe swallow strategies   "

## 2024-04-10 ENCOUNTER — APPOINTMENT (OUTPATIENT)
Dept: NEUROLOGY | Facility: CLINIC | Age: 54
End: 2024-04-10
Payer: COMMERCIAL

## 2024-05-29 NOTE — PROGRESS NOTES
Cardiology Subsequent Encounter Clinic Note  Name: Smith Gray II  MRN: [id number]  : 1970    CC: Atypical chest pain    Active Issues:  Smith Gray II is a 54 y.o. male with a medical history of hyperlipidemia here to establish care regarding the following:     Abnormal stress test:  -Findings that are possibly artifactual noted on stress test May 2023  -Exercised for 10 minutes 20 seconds; no electrocardiographic changes consistent with ischemia  -CT calcium score performed 2021 was 0     Currently the patient notes occasional episodes of chest discomfort that have been ongoing for the past 2-4 years. He describes them as being randomly triggered; no clear relationship to exertion. Can last for minutes to hours at a time. No relationship to palpation. Occasionally change with position and respiration. Denies any exertional dyspnea. Does note occasional fatigue. No orthopnea/PND/lower extremity edema.       Past Medical History  No past medical history on file.    Past Surgical History  Past Surgical History:   Procedure Laterality Date    OTHER SURGICAL HISTORY  2020    Back surgery    OTHER SURGICAL HISTORY  2020    Knee arthroscopy    OTHER SURGICAL HISTORY  2020    [place] tooth extraction    OTHER SURGICAL HISTORY  2022    Colonoscopy       Medications  Current Outpatient Medications on File Prior to Visit   Medication Sig Dispense Refill    multivitamin tablet Take 1 tablet by mouth once daily.      pantoprazole (ProtoNix) 40 mg EC tablet Take 1 tablet (40 mg) by mouth once daily. 90 tablet 3    sertraline (Zoloft) 25 mg tablet Take 1 tablet (25 mg) by mouth once daily. (Patient not taking: Reported on 2024) 30 tablet 11     No current facility-administered medications on file prior to visit.       Allergies  No Known Allergies    Social History  Social History     Tobacco Use    Smoking status: Former     Types: Cigars    Smokeless tobacco: Never   Vaping Use     "Vaping status: Never Used   Substance Use Topics    Alcohol use: Yes     Comment: occ    Drug use: Never       Family History  Family History   Problem Relation Name Age of Onset    ALS Mother      No Known Problems Father         Physical Examination  Vitals: /74   Pulse 63   Ht 1.854 m (6' 1\")   Wt 94.3 kg (208 lb)   SpO2 99%   BMI 27.44 kg/m²   General: awake, alert and oriented. No acute distress.   Skin: Skin is warm, dry and intact without rashes or lesions. Appropriate color for ethnicity. Nail beds pink with no cyanosis or clubbing  HEENT: normocephalic, atraumatic; conjunctivae are clear without exudates or hemorrhage. Sclera is non-icteric. Eyelids are normal in appearance without swelling or lesions. Hearing intact. Nares are patent bilaterally. Moist mucous membranes.   Cardiovascular: Regular. No murmurs, gallops, or rubs are auscultated. S1 and S2 are heard and are of normal intensity. No JVD, no carotid bruits  Respiratory: Thorax symmetric. CTAB, breath sounds vesicular. No crackles, wheezes or ronchi.   Gastrointestinal: soft, non-distended, BS + x 4  Genitourinary: exam deferred  Musculoskeletal: moves all extremities  Extremities: pulses palpable bilaterally; no swelling or erythema; no edema  Neurological: alert & oriented x 3; no focal deficits  Psychiatric: appropriate mood and affect       Labs/Imaging/Procedures    Lab Results   Component Value Date    HGB 16.2 10/31/2023    HGB 16.4 04/21/2023    HGB 15.7 04/29/2021    HGB 16.6 12/22/2020     10/31/2023    WBC 5.4 10/31/2023     10/31/2023    K 4.0 10/31/2023    CREATININE 0.99 10/31/2023    CREATININE 1.06 04/21/2023    CREATININE 1.16 03/22/2022    CREATININE 1.08 04/29/2021    BUN 18 10/31/2023    CALCIUM 9.3 10/31/2023    LDLF 128 (H) 04/21/2023     No echocardiogram results found for the past 12 months  EMG & nerve conduction  Impression:    This is a normal study. There is no electrophysiologic evidence of motor " neuron disease. In addition, there was no electrophysiologic evidence of myopathy, motor neuropathy or right cervical or lumbar radiculopathy.    Pamela Waldron M.D.,Ph.D.    --------------------------------------------------------------------------------------------------------------------------------------------------------------------------------------------------------------------------------------------------------------  -----------      Impression  Smith Gray II is a 54 y.o. male with a medical history of hyperlipidemia here to establish care regarding the following:     Abnormal stress test:  -Findings that are possibly artifactual noted on stress test May 2023  -Exercised for 10 minutes 20 seconds; no electrocardiographic changes consistent with ischemia  -CT calcium score performed June 2021 was 0     Plan:  -Patient symptoms do not sound cardiac in etiology; his chest discomfort sounds noncardiac. The negative stress test; and the low CT calcium score is particularly reassuring.   -Notably he has a mildly ectatic aortic root on his echocardiogram (3.9 cm).  Serial CT calcium scores have also commented on a ectatic aorta; however these were done without contrast and the dimensions have remained relatively constant.  I do not think regular surveillance is warranted at this time.  Blood pressure at goal.  No family history of aneurysms.    RTC PRN    Trell Velazquez MD  Advanced Heart Failure/Transplant Cardiology  Cardio-Oncology  [facility]

## 2024-05-30 ENCOUNTER — OFFICE VISIT (OUTPATIENT)
Dept: CARDIOLOGY | Facility: CLINIC | Age: 54
End: 2024-05-30
Payer: COMMERCIAL

## 2024-05-30 ENCOUNTER — OFFICE VISIT (OUTPATIENT)
Dept: NEUROLOGY | Facility: CLINIC | Age: 54
End: 2024-05-30
Payer: COMMERCIAL

## 2024-05-30 VITALS
WEIGHT: 206.6 LBS | BODY MASS INDEX: 27.38 KG/M2 | DIASTOLIC BLOOD PRESSURE: 82 MMHG | HEART RATE: 76 BPM | HEIGHT: 73 IN | SYSTOLIC BLOOD PRESSURE: 120 MMHG

## 2024-05-30 VITALS
HEIGHT: 73 IN | HEART RATE: 63 BPM | SYSTOLIC BLOOD PRESSURE: 120 MMHG | WEIGHT: 208 LBS | OXYGEN SATURATION: 99 % | DIASTOLIC BLOOD PRESSURE: 74 MMHG | BODY MASS INDEX: 27.57 KG/M2

## 2024-05-30 DIAGNOSIS — R94.39 ABNORMAL STRESS TEST: Primary | ICD-10-CM

## 2024-05-30 DIAGNOSIS — K21.00 GASTROESOPHAGEAL REFLUX DISEASE WITH ESOPHAGITIS WITHOUT HEMORRHAGE: Primary | ICD-10-CM

## 2024-05-30 DIAGNOSIS — R07.89 ATYPICAL CHEST PAIN: ICD-10-CM

## 2024-05-30 DIAGNOSIS — E78.2 HYPERLIPEMIA, MIXED: ICD-10-CM

## 2024-05-30 PROCEDURE — 93000 ELECTROCARDIOGRAM COMPLETE: CPT | Performed by: STUDENT IN AN ORGANIZED HEALTH CARE EDUCATION/TRAINING PROGRAM

## 2024-05-30 PROCEDURE — 99214 OFFICE O/P EST MOD 30 MIN: CPT | Performed by: PSYCHIATRY & NEUROLOGY

## 2024-05-30 PROCEDURE — 99214 OFFICE O/P EST MOD 30 MIN: CPT | Performed by: STUDENT IN AN ORGANIZED HEALTH CARE EDUCATION/TRAINING PROGRAM

## 2024-05-30 ASSESSMENT — VISUAL ACUITY: VA_NORMAL: 1

## 2024-05-30 NOTE — PROGRESS NOTES
"Smith Gray II \"Speedy\" is a 54 y.o. male with follow up   No new issues. Discussed Ba swallow and its vidoes and he has mild delay to pass pill at the LES. Need more to discuss with GI. He has the habit o taking late night snacks and GERD since early adult millan.  Lately PPI's are not helping him.     .    Active Ambulatory Problems     Diagnosis Date Noted    Dysfunction of both eustachian tubes 04/20/2023    Fatigue 04/20/2023    GERD (gastroesophageal reflux disease) 04/20/2023    Hyperlipemia, mixed 04/20/2023    Lung nodules 04/20/2023    Nasal congestion 04/20/2023    Nocturia 04/20/2023    Obstructive sleep apnea 04/20/2023    Positive colorectal cancer screening using Cologuard test 04/20/2023    Chest pain 04/21/2023    Palpitations 04/21/2023    Thoracic aortic aneurysm without rupture (CMS-HCC) 06/12/2023    Esophageal dysphagia 03/08/2024     Resolved Ambulatory Problems     Diagnosis Date Noted    Myalgia 04/20/2023    Numbness 04/20/2023    Pain in both hands 04/20/2023    Pain of right hand 04/20/2023     No Additional Past Medical History        Neurologic Exam     Mental Status   Oriented to person, place, and time.   Registration: recalls 1 of 3 objects.   Attention: normal. Concentration: normal.   Speech: speech is normal   Level of consciousness: alert  Knowledge: good.   Able to name object. Able to read. Able to repeat. Able to write. Normal comprehension.     Cranial Nerves     CN II   Visual acuity: normal  Right visual field deficit: none  Left visual field deficit: none     CN III, IV, VI   Pupils are equal, round, and reactive to light.  Extraocular motions are normal.   Right pupil: Shape: regular. Accommodation: intact.   Left pupil: Shape: regular. Accommodation: intact.   CN VI: no CN VI palsy  Nystagmus: none     CN V   Facial sensation intact.     CN VII   Facial expression full, symmetric.     CN VIII   CN VIII normal.     CN IX, X   CN IX normal.     CN XI   CN XI normal. " "  Right sternocleidomastoid strength: normal  Left sternocleidomastoid strength: normal  Right trapezius strength: normal  Left trapezius strength: normal    CN XII   CN XII normal.     Motor Exam   Muscle bulk: normal  Right arm tone: normal  Left arm tone: normal  Right arm pronator drift: absent  Left arm pronator drift: absent  Right leg tone: normal  Left leg tone: normal    Sensory Exam   Light touch normal.   Vibration normal.   Proprioception normal.   Pinprick normal.     Gait, Coordination, and Reflexes     Gait  Gait: normal    Coordination   Romberg: negative  Finger to nose coordination: normal  Heel to shin coordination: normal  Tandem walking coordination: normal    Tremor   Resting tremor: absent  Intention tremor: absent  Action tremor: absent    Reflexes   Right brachioradialis: 2+  Left brachioradialis: 2+  Right biceps: 2+  Left biceps: 2+  Right triceps: 2+  Left triceps: 2+  Right patellar: 2+  Left patellar: 2+  Right achilles: 2+  Left achilles: 2+  Right : 2+  Left : 2+        MEDICATIONS:  Current Outpatient Medications on File Prior to Visit   Medication Sig Dispense Refill    multivitamin tablet Take 1 tablet by mouth once daily.      pantoprazole (ProtoNix) 40 mg EC tablet Take 1 tablet (40 mg) by mouth once daily. 90 tablet 3    [DISCONTINUED] sertraline (Zoloft) 25 mg tablet Take 1 tablet (25 mg) by mouth once daily. (Patient not taking: Reported on 5/30/2024) 30 tablet 11     No current facility-administered medications on file prior to visit.          Objective         Last Recorded Vitals  Blood pressure 120/82, pulse 76, height 1.854 m (6' 1\"), weight 93.7 kg (206 lb 9.6 oz).      Relevant Results   No results found for this or any previous visit (from the past 96 hour(s)).     ECG 12 lead (Clinic Performed)    Result Date: 5/30/2024  EKG performed in clinic today shows normal sinus rhythm with no resting ST-T segment changes      I have personally reviewed the following " imaging for brain (CT/ MR) and results and discussed in detail with the patient/care giver/family              Assessment/Plan   GERD and see GI for Upper GI endoscopy and rule out Malvin's esophagitis or like conditions.  I have advised him not to take any food three hours prior to sleep   Patient/Family Education: Extensive time was spent educating the patient on relevant anatomy, clinical findings and imaging, as well as discussing the potential diagnoses as discussed above.  Pharmacology: as above. Exercise: I discussed the importance of maintaining a daily exercise program, including stretching and strengthening. Preventative strategies were reviewed, specifically avoidance of any exercises that exacerbate pain.Return to online virtual visit/ clinic visit for follow-up with SANJIV Zimmerman in 12 weeks or sooner as needed.The patient expressed understanding and agreement with the assessment and plan.  Patient encouraged to contact us should they have any questions, concerns, or any changes in symptoms. Thank you for allowing me to participate in the care of your patient.** This note is created using speech recognition transcription software. Despite proofreading, several typographical errors might be present that might affect the meaning of the content. Please call with any questions.**

## 2024-06-13 ENCOUNTER — APPOINTMENT (OUTPATIENT)
Dept: PRIMARY CARE | Facility: CLINIC | Age: 54
End: 2024-06-13
Payer: COMMERCIAL

## 2024-06-26 ENCOUNTER — APPOINTMENT (OUTPATIENT)
Dept: PRIMARY CARE | Facility: CLINIC | Age: 54
End: 2024-06-26
Payer: COMMERCIAL

## 2024-07-05 ENCOUNTER — APPOINTMENT (OUTPATIENT)
Dept: PRIMARY CARE | Facility: CLINIC | Age: 54
End: 2024-07-05
Payer: COMMERCIAL

## 2024-07-05 ENCOUNTER — LAB (OUTPATIENT)
Dept: LAB | Facility: LAB | Age: 54
End: 2024-07-05
Payer: COMMERCIAL

## 2024-07-05 VITALS
HEART RATE: 76 BPM | DIASTOLIC BLOOD PRESSURE: 76 MMHG | BODY MASS INDEX: 27.3 KG/M2 | WEIGHT: 206 LBS | SYSTOLIC BLOOD PRESSURE: 118 MMHG | HEIGHT: 73 IN | OXYGEN SATURATION: 99 %

## 2024-07-05 DIAGNOSIS — E78.2 HYPERLIPEMIA, MIXED: ICD-10-CM

## 2024-07-05 DIAGNOSIS — Z12.5 SCREENING FOR PROSTATE CANCER: ICD-10-CM

## 2024-07-05 DIAGNOSIS — R00.2 PALPITATIONS: ICD-10-CM

## 2024-07-05 DIAGNOSIS — Z00.00 WELL ADULT EXAM: Primary | ICD-10-CM

## 2024-07-05 DIAGNOSIS — K21.9 GASTROESOPHAGEAL REFLUX DISEASE WITHOUT ESOPHAGITIS: ICD-10-CM

## 2024-07-05 DIAGNOSIS — R53.83 FATIGUE, UNSPECIFIED TYPE: ICD-10-CM

## 2024-07-05 DIAGNOSIS — Z12.5 PROSTATE CANCER SCREENING: ICD-10-CM

## 2024-07-05 DIAGNOSIS — I71.20 THORACIC AORTIC ANEURYSM WITHOUT RUPTURE, UNSPECIFIED PART (CMS-HCC): ICD-10-CM

## 2024-07-05 PROBLEM — R19.5 POSITIVE COLORECTAL CANCER SCREENING USING COLOGUARD TEST: Status: RESOLVED | Noted: 2023-04-20 | Resolved: 2024-07-05

## 2024-07-05 PROBLEM — R07.9 CHEST PAIN: Status: RESOLVED | Noted: 2023-04-21 | Resolved: 2024-07-05

## 2024-07-05 LAB
ALBUMIN SERPL BCP-MCNC: 4.2 G/DL (ref 3.4–5)
ALP SERPL-CCNC: 77 U/L (ref 33–120)
ALT SERPL W P-5'-P-CCNC: 23 U/L (ref 10–52)
ANION GAP SERPL CALC-SCNC: 11 MMOL/L (ref 10–20)
AST SERPL W P-5'-P-CCNC: 20 U/L (ref 9–39)
BILIRUB SERPL-MCNC: 0.7 MG/DL (ref 0–1.2)
BUN SERPL-MCNC: 17 MG/DL (ref 6–23)
CALCIUM SERPL-MCNC: 8.9 MG/DL (ref 8.6–10.3)
CHLORIDE SERPL-SCNC: 106 MMOL/L (ref 98–107)
CO2 SERPL-SCNC: 25 MMOL/L (ref 21–32)
CREAT SERPL-MCNC: 1.14 MG/DL (ref 0.5–1.3)
EGFRCR SERPLBLD CKD-EPI 2021: 76 ML/MIN/1.73M*2
GLUCOSE SERPL-MCNC: 117 MG/DL (ref 74–99)
POTASSIUM SERPL-SCNC: 3.9 MMOL/L (ref 3.5–5.3)
PROT SERPL-MCNC: 6.5 G/DL (ref 6.4–8.2)
PSA SERPL-MCNC: 1.12 NG/ML
SODIUM SERPL-SCNC: 138 MMOL/L (ref 136–145)

## 2024-07-05 PROCEDURE — 4004F PT TOBACCO SCREEN RCVD TLK: CPT | Performed by: FAMILY MEDICINE

## 2024-07-05 PROCEDURE — 36415 COLL VENOUS BLD VENIPUNCTURE: CPT

## 2024-07-05 PROCEDURE — 84153 ASSAY OF PSA TOTAL: CPT

## 2024-07-05 PROCEDURE — 99396 PREV VISIT EST AGE 40-64: CPT | Performed by: FAMILY MEDICINE

## 2024-07-05 PROCEDURE — 80053 COMPREHEN METABOLIC PANEL: CPT

## 2024-07-05 ASSESSMENT — ENCOUNTER SYMPTOMS
ABDOMINAL DISTENTION: 0
COUGH: 0
SHORTNESS OF BREATH: 0
LIGHT-HEADEDNESS: 0
DIZZINESS: 0
RHINORRHEA: 0
DIARRHEA: 0
UNEXPECTED WEIGHT CHANGE: 0
HEADACHES: 0
CONSTIPATION: 0
ARTHRALGIAS: 0
VOMITING: 0
NAUSEA: 0
PALPITATIONS: 0
WHEEZING: 0
DIFFICULTY URINATING: 0
ADENOPATHY: 0
APPETITE CHANGE: 0
TROUBLE SWALLOWING: 0
FATIGUE: 0
NUMBNESS: 0
ACTIVITY CHANGE: 0
ABDOMINAL PAIN: 0

## 2024-07-05 ASSESSMENT — PATIENT HEALTH QUESTIONNAIRE - PHQ9
SUM OF ALL RESPONSES TO PHQ9 QUESTIONS 1 AND 2: 0
2. FEELING DOWN, DEPRESSED OR HOPELESS: NOT AT ALL
1. LITTLE INTEREST OR PLEASURE IN DOING THINGS: NOT AT ALL

## 2024-07-05 NOTE — PROGRESS NOTES
"Subjective   Patient ID: Speedy Gray II is a 54 y.o. male who presents for Annual Exam (Annual exam with labs. ).    HPI   No headache, chest pain, shortness of breath, dizziness, lightheadedness, or edema  Taking PPI daily without breakthrough symptoms.  Reviewed dietary, caffeine, tobacco, alcohol, and NSAID use.  No dyspepsia, dysphagia, reflux, melena, or abdominal pain.  Headaches some better, seen neurology, having some swallowing issues at times, to see GI later this month, some hoarseness, no coughing, PPI has helped, notices an issue if misses medicine  Stress manageable    Review of Systems   Constitutional:  Negative for activity change, appetite change, fatigue and unexpected weight change.   HENT:  Negative for ear pain, nosebleeds, rhinorrhea, sneezing and trouble swallowing.    Respiratory:  Negative for cough, shortness of breath and wheezing.    Cardiovascular:  Negative for chest pain, palpitations and leg swelling.   Gastrointestinal:  Negative for abdominal distention, abdominal pain, constipation, diarrhea, nausea and vomiting.   Genitourinary:  Negative for difficulty urinating.   Musculoskeletal:  Negative for arthralgias.   Skin:  Negative for rash.   Neurological:  Negative for dizziness, light-headedness, numbness and headaches.   Hematological:  Negative for adenopathy.   Psychiatric/Behavioral:  Negative for behavioral problems.    All other systems reviewed and are negative.      Objective   /76 (BP Location: Right arm, Patient Position: Sitting)   Pulse 76   Ht 1.854 m (6' 1\")   Wt 93.4 kg (206 lb)   SpO2 99%   BMI 27.18 kg/m²     Physical Exam  Vitals and nursing note reviewed.   Constitutional:       General: He is not in acute distress.     Appearance: Normal appearance. He is not toxic-appearing.   HENT:      Head: Normocephalic and atraumatic.      Right Ear: Tympanic membrane, ear canal and external ear normal.      Left Ear: Tympanic membrane, ear canal and external " ear normal.      Nose: Nose normal.      Mouth/Throat:      Mouth: Mucous membranes are moist.      Pharynx: Oropharynx is clear.   Eyes:      Extraocular Movements: Extraocular movements intact.      Conjunctiva/sclera: Conjunctivae normal.      Pupils: Pupils are equal, round, and reactive to light.   Cardiovascular:      Rate and Rhythm: Normal rate and regular rhythm.      Pulses: Normal pulses.      Heart sounds: Normal heart sounds.   Pulmonary:      Effort: Pulmonary effort is normal.      Breath sounds: Normal breath sounds.   Abdominal:      General: Abdomen is flat. Bowel sounds are normal.      Palpations: Abdomen is soft.   Musculoskeletal:      Cervical back: Normal range of motion and neck supple.   Skin:     General: Skin is warm and dry.      Capillary Refill: Capillary refill takes less than 2 seconds.   Neurological:      General: No focal deficit present.      Mental Status: He is alert and oriented to person, place, and time. Mental status is at baseline.   Psychiatric:         Mood and Affect: Mood normal.         Behavior: Behavior normal.         Assessment/Plan   Problem List Items Addressed This Visit             ICD-10-CM    Fatigue R53.83    Relevant Orders    Follow Up In Primary Care - Established    GERD (gastroesophageal reflux disease) K21.9     Has had some testing done recently that suggest some dysmotility of the lower esophagus, PPI does seem to help, to see gastroenterology in the next month for EGD.         Relevant Orders    Follow Up In Primary Care - Established    Hyperlipemia, mixed E78.2    Relevant Orders    Follow Up In Primary Care - Established    Comprehensive Metabolic Panel    Palpitations R00.2     Stable currently, not really a problem or noticeable.         Relevant Orders    Follow Up In Primary Care - Established    Comprehensive Metabolic Panel    Thoracic aortic aneurysm without rupture (CMS-HCC) I71.20     Seen cardiology in the past year, repeat testing has  not shown any change.          Other Visit Diagnoses         Codes    Well adult exam    -  Primary Z00.00    Colonoscopy up-to-date, check PSA testing today, encouraged about diet and exercise.     Relevant Orders    Follow Up In Primary Care - Established    Prostate cancer screening     Z12.5    Relevant Orders    Follow Up In Primary Care - Established    Screening for prostate cancer     Z12.5    Relevant Orders    Follow Up In Primary Care - Established    Prostate Specific Antigen, Screen

## 2024-07-05 NOTE — ASSESSMENT & PLAN NOTE
Has had some testing done recently that suggest some dysmotility of the lower esophagus, PPI does seem to help, to see gastroenterology in the next month for EGD.

## 2024-07-15 ENCOUNTER — OFFICE VISIT (OUTPATIENT)
Dept: URGENT CARE | Facility: CLINIC | Age: 54
End: 2024-07-15
Payer: COMMERCIAL

## 2024-07-15 VITALS
RESPIRATION RATE: 16 BRPM | TEMPERATURE: 98.7 F | WEIGHT: 205 LBS | DIASTOLIC BLOOD PRESSURE: 68 MMHG | HEART RATE: 78 BPM | HEIGHT: 73 IN | BODY MASS INDEX: 27.17 KG/M2 | OXYGEN SATURATION: 97 % | SYSTOLIC BLOOD PRESSURE: 115 MMHG
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
## 2024-07-15 PROCEDURE — 99213 OFFICE O/P EST LOW 20 MIN: CPT | Performed by: NURSE PRACTITIONER

## 2024-07-15 RX ORDER — PREDNISONE 10 MG/1
TABLET ORAL
Qty: 21 TABLET | Refills: 0 | Status: SHIPPED | OUTPATIENT
Start: 2024-07-15 | End: 2024-07-20

## 2024-07-15 RX ORDER — TRIAMCINOLONE ACETONIDE 5 MG/G
CREAM TOPICAL 3 TIMES DAILY
Qty: 60 G | Refills: 0 | Status: SHIPPED | OUTPATIENT
Start: 2024-07-15

## 2024-07-15 NOTE — PROGRESS NOTES
54 y.o. male presents for evaluation of multiple insect bites to chest, abdomen, back and bilateral arms that began yesterday evening.  [place] was [place] yesterday and remembers getting bitten by something but does not recall seeing what it was.  No fever, body aches, headache, fatigue or any other associated symptom or complaint.      Vitals:    07/15/24 1507   BP: 115/68   Pulse: 78   Resp: 16   Temp: 37.1 °C (98.7 °F)   SpO2: 97%       No Known Allergies    Medication Documentation Review Audit       Reviewed by Tio العلي MA (Medical Assistant) on 07/15/24 at 1509      Medication Order Taking? Sig Documenting Provider Last Dose Status   multivitamin tablet [id number] Yes Take 1 tablet by mouth once daily. Historical Provider, MD Taking Active   pantoprazole (ProtoNix) 40 mg EC tablet [id number] Yes Take 1 tablet (40 mg) by mouth once daily. Diego Reynolds MD Taking Active                    No past medical history on file.    Past Surgical History:   Procedure Laterality Date    OTHER SURGICAL HISTORY  12/21/2020    Back surgery    OTHER SURGICAL HISTORY  12/21/2020    Knee arthroscopy    OTHER SURGICAL HISTORY  12/21/2020    [place] tooth extraction    OTHER SURGICAL HISTORY  04/26/2022    Colonoscopy       ROS  See HPI    Physical Exam  Vitals and nursing note reviewed.   Constitutional:       Appearance: Normal appearance.   HENT:      Head: Normocephalic and atraumatic.   Skin:     General: Skin is warm and dry.      Findings: Rash (Multiple insect bites scattered over chest, abdomen, back and bilateral arms without any areas concerning of bacterial infection or EM rash) present.   Neurological:      General: No focal deficit present.      Mental Status: He is alert and oriented to person, place, and time.   Psychiatric:         Mood and Affect: Mood normal.         Behavior: Behavior normal.         Thought Content: Thought content normal.         Judgment: Judgment  "normal.           Assessment/Plan/MDM  Smith \"Speedy\" was seen today for insect bite.  Diagnoses and all orders for this visit:  Insect bite, unspecified site, initial encounter (Primary)  -     predniSONE (Deltasone) 10 mg tablet; Take 6 tablets (60 mg) by mouth once daily for 1 day, THEN 5 tablets (50 mg) once daily for 1 day, THEN 4 tablets (40 mg) once daily for 1 day, THEN 3 tablets (30 mg) once daily for 1 day, THEN 2 tablets (20 mg) once daily for 1 day, THEN 1 tablet (10 mg) once daily for 1 day.  -     triamcinolone (Kenalog) 0.5 % cream; Apply topically 3 times a day.    Patient's clinical presentation is otherwise unremarkable at this time. Patient is discharged with instructions to follow-up with primary care or seek emergency medical attention for worsening symptoms or any new concerns.    I did personally review Smith's past medical history, surgical history, social history, as well as family history (when relevant).  In this case, I also oversaw the his drug management by reviewing his medication list, allergy list, as well as the medications that I prescribed during the UC course and/or recommended as an out-patient (including possible OTC medications such as acetaminophen, NSAIDs , etc).    After reviewing the items above, I did not look at previous medical documentation, such as recent hospitalizations, office visits, and/or recent consultations with PCP/specialist.                          SDOH:   Another factor that I considered in Smith's care was his Social Determinants of Health (SDOH). During this UC encounter, he did not have social determinants of health. Those SDOH influencing Smith's care are: none      Perico Tanner CNP  [facility]  [phone]  "

## 2024-07-24 ENCOUNTER — APPOINTMENT (OUTPATIENT)
Dept: GASTROENTEROLOGY | Facility: CLINIC | Age: 54
End: 2024-07-24
Payer: COMMERCIAL

## 2024-07-24 VITALS
DIASTOLIC BLOOD PRESSURE: 92 MMHG | SYSTOLIC BLOOD PRESSURE: 143 MMHG | HEART RATE: 73 BPM | BODY MASS INDEX: 27.17 KG/M2 | RESPIRATION RATE: 16 BRPM | OXYGEN SATURATION: 98 % | WEIGHT: 205 LBS | HEIGHT: 73 IN

## 2024-07-24 DIAGNOSIS — R13.10 DYSPHAGIA, UNSPECIFIED TYPE: ICD-10-CM

## 2024-07-24 DIAGNOSIS — R13.10 DYSPHAGIA, UNSPECIFIED TYPE: Primary | ICD-10-CM

## 2024-07-24 DIAGNOSIS — K21.9 GASTROESOPHAGEAL REFLUX DISEASE, UNSPECIFIED WHETHER ESOPHAGITIS PRESENT: ICD-10-CM

## 2024-07-24 DIAGNOSIS — K21.00 GASTROESOPHAGEAL REFLUX DISEASE WITH ESOPHAGITIS WITHOUT HEMORRHAGE: ICD-10-CM

## 2024-07-24 PROCEDURE — 3008F BODY MASS INDEX DOCD: CPT | Performed by: NURSE PRACTITIONER

## 2024-07-24 PROCEDURE — 4004F PT TOBACCO SCREEN RCVD TLK: CPT | Performed by: NURSE PRACTITIONER

## 2024-07-24 PROCEDURE — 99203 OFFICE O/P NEW LOW 30 MIN: CPT | Performed by: NURSE PRACTITIONER

## 2024-07-24 NOTE — PROGRESS NOTES
"Subjective   Patient ID: Smith Gray II \"Speedy\" is a 54 y.o. male who presents for Dysphagia (Feels as if he has residual food stuck in throat after eating. He states that a couple of years ago he would have to force himself to swallow. He is constantly clearing throat. He takes pantoprazole, helps GERD. Has never had EGD before. He did complete barium swallow recently.).  HPI  Patient presents to the GI clinic with complaints of dysphagia to solids only on an intermittent basis.  This started 9 to 10 months ago.  Feels pressure in his throat and esophagus after eating.  No odynophagia.  No nausea or vomiting.  Weight and appetite are stable.  Clears his throat often.  Occasional cigar.  Occasional alcohol.  Uses Advil as needed.  No family history of CRC or IBD.  GERD for the past 2 to 3 years taking PPI daily, unable to skip a dose.  No previous EGD.    -> Colonoscopy :  - The examined portion of the ileum was normal.                         - One 12 mm polyp in the descending colon, removed                          with a hot snare. Resected and retrieved.+TA  Current Outpatient Medications on File Prior to Visit   Medication Sig Dispense Refill    multivitamin tablet Take 1 tablet by mouth once daily.      triamcinolone (Kenalog) 0.5 % cream Apply topically 3 times a day. 60 g 0    pantoprazole (ProtoNix) 40 mg EC tablet Take 1 tablet (40 mg) by mouth once daily. 90 tablet 3    [] predniSONE (Deltasone) 10 mg tablet Take 6 tablets (60 mg) by mouth once daily for 1 day, THEN 5 tablets (50 mg) once daily for 1 day, THEN 4 tablets (40 mg) once daily for 1 day, THEN 3 tablets (30 mg) once daily for 1 day, THEN 2 tablets (20 mg) once daily for 1 day, THEN 1 tablet (10 mg) once daily for 1 day. 21 tablet 0     No current facility-administered medications on file prior to visit.        Review of Systems   All other systems reviewed and are negative.      Objective   Physical Exam  Vitals reviewed. " "  Constitutional:       General: He is awake. He is not in acute distress.     Appearance: Normal appearance. He is well-developed and normal weight. He is not ill-appearing, toxic-appearing or diaphoretic.   HENT:      Head: Normocephalic and atraumatic.   Eyes:      General: No scleral icterus.     Pupils: Pupils are equal, round, and reactive to light.   Cardiovascular:      Rate and Rhythm: Normal rate and regular rhythm.      Heart sounds: Normal heart sounds. No murmur heard.  Pulmonary:      Effort: Pulmonary effort is normal. No respiratory distress.      Breath sounds: Normal breath sounds.   Abdominal:      General: Bowel sounds are normal.      Palpations: Abdomen is soft. There is no hepatomegaly.      Tenderness: There is no abdominal tenderness.   Musculoskeletal:         General: Normal range of motion.      Cervical back: Normal range of motion.      Right lower leg: No edema.      Left lower leg: No edema.   Skin:     General: Skin is warm and dry.      Coloration: Skin is not jaundiced or pale.      Findings: No bruising.   Neurological:      General: No focal deficit present.      Mental Status: He is alert and oriented to person, place, and time.      Motor: No weakness.      Gait: Gait normal.   Psychiatric:         Mood and Affect: Mood normal.         Behavior: Behavior normal. Behavior is cooperative.         Thought Content: Thought content normal.         Judgment: Judgment normal.       BP (!) 143/92   Pulse 73   Resp 16   Ht 1.854 m (6' 1\")   Wt 93 kg (205 lb)   SpO2 98%   BMI 27.05 kg/m²      Lab Results   Component Value Date    WBC 5.4 10/31/2023    HGB 16.2 10/31/2023    HCT 47.6 10/31/2023    MCV 92 10/31/2023     10/31/2023       Lab Results   Component Value Date    TSH 1.97 04/21/2023       Assessment/Plan   Diagnoses and all orders for this visit:  54-year-old male with chronic GERD.  Good response to daily PPI, but unable to skip a dose.  No previous endoscopy.  Now " with a new complaint of dysphagia to solids only on an intermittent basis.  Risk factors include occasional smoking and NSAID use.  At this time I recommend an upper endoscopy to rule out organic etiology to his complaints.  The procedure was discussed at length, including all possible risks.  Patient to be scheduled at  outpatient [place] end[facility] center with Dr. Levin.  DDx: Esophagitis, spasms from GERD, Schatzki's ring, dysmotility?  Dysphagia, unspecified type  Gastroesophageal reflux disease, unspecified whether esophagitis present  -Daily PPI 15 to 30 minutes before breakfast  -Antireflux lifestyle  -Follow-up after endoscopy, okay for virtual.    Lexis Mays CNP

## 2024-07-30 ENCOUNTER — HOSPITAL ENCOUNTER (OUTPATIENT)
Dept: GASTROENTEROLOGY | Facility: CLINIC | Age: 54
Setting detail: OUTPATIENT SURGERY
Discharge: HOME | End: 2024-07-30
Payer: COMMERCIAL

## 2024-07-30 VITALS
SYSTOLIC BLOOD PRESSURE: 123 MMHG | BODY MASS INDEX: 26.52 KG/M2 | TEMPERATURE: 98.1 F | DIASTOLIC BLOOD PRESSURE: 72 MMHG | OXYGEN SATURATION: 96 % | WEIGHT: 201 LBS | RESPIRATION RATE: 16 BRPM | HEART RATE: 74 BPM

## 2024-07-30 DIAGNOSIS — R13.10 DYSPHAGIA, UNSPECIFIED TYPE: ICD-10-CM

## 2024-07-30 PROCEDURE — 2500000005 HC RX 250 GENERAL PHARMACY W/O HCPCS: Performed by: INTERNAL MEDICINE

## 2024-07-30 PROCEDURE — 7100000010 HC PHASE TWO TIME - EACH INCREMENTAL 1 MINUTE

## 2024-07-30 PROCEDURE — 2500000004 HC RX 250 GENERAL PHARMACY W/ HCPCS (ALT 636 FOR OP/ED): Performed by: INTERNAL MEDICINE

## 2024-07-30 PROCEDURE — 7100000009 HC PHASE TWO TIME - INITIAL BASE CHARGE

## 2024-07-30 PROCEDURE — 43239 EGD BIOPSY SINGLE/MULTIPLE: CPT | Performed by: INTERNAL MEDICINE

## 2024-07-30 PROCEDURE — 3700000012 HC SEDATION LEVEL 5+ TIME - INITIAL 15 MINUTES 5/> YEARS

## 2024-07-30 PROCEDURE — 99152 MOD SED SAME PHYS/QHP 5/>YRS: CPT | Performed by: INTERNAL MEDICINE

## 2024-07-30 RX ORDER — SODIUM CHLORIDE, SODIUM LACTATE, POTASSIUM CHLORIDE, CALCIUM CHLORIDE 600; 310; 30; 20 MG/100ML; MG/100ML; MG/100ML; MG/100ML
20 INJECTION, SOLUTION INTRAVENOUS CONTINUOUS
Status: DISCONTINUED | OUTPATIENT
Start: 2024-07-30 | End: 2024-07-31 | Stop reason: HOSPADM

## 2024-07-30 RX ORDER — MIDAZOLAM HYDROCHLORIDE 5 MG/ML
INJECTION, SOLUTION INTRAMUSCULAR; INTRAVENOUS AS NEEDED
Status: COMPLETED | OUTPATIENT
Start: 2024-07-30 | End: 2024-07-30

## 2024-07-30 RX ORDER — FENTANYL CITRATE 50 UG/ML
INJECTION, SOLUTION INTRAMUSCULAR; INTRAVENOUS AS NEEDED
Status: COMPLETED | OUTPATIENT
Start: 2024-07-30 | End: 2024-07-30

## 2024-07-30 ASSESSMENT — PAIN SCALES - GENERAL
PAINLEVEL_OUTOF10: 2
PAINLEVEL_OUTOF10: 0 - NO PAIN

## 2024-07-30 ASSESSMENT — PAIN - FUNCTIONAL ASSESSMENT
PAIN_FUNCTIONAL_ASSESSMENT: 0-10
PAIN_FUNCTIONAL_ASSESSMENT: 0-10

## 2024-07-30 ASSESSMENT — COLUMBIA-SUICIDE SEVERITY RATING SCALE - C-SSRS
2. HAVE YOU ACTUALLY HAD ANY THOUGHTS OF KILLING YOURSELF?: NO
1. IN THE PAST MONTH, HAVE YOU WISHED YOU WERE DEAD OR WISHED YOU COULD GO TO SLEEP AND NOT WAKE UP?: NO
6. HAVE YOU EVER DONE ANYTHING, STARTED TO DO ANYTHING, OR PREPARED TO DO ANYTHING TO END YOUR LIFE?: NO

## 2024-07-30 NOTE — H&P
"History Of Present Illness  Smith Gray II \"Speedy\" is a 54 y.o. male presenting with dysphagia underlying GERD.  Presents for endoscopy after referral and evaluation with GI and [place].  Patient admits intermittent dysphagia no true foreign body.  Minimal improvement with PPI therapy is undergoing endoscopy to exclude structural disease and rule out EOE..     Past Medical History  Past Medical History:   Diagnosis Date    Sleep apnea      Surgical History  Past Surgical History:   Procedure Laterality Date    OTHER SURGICAL HISTORY  12/21/2020    Back surgery    OTHER SURGICAL HISTORY  12/21/2020    Knee arthroscopy    OTHER SURGICAL HISTORY  12/21/2020    [place] tooth extraction    OTHER SURGICAL HISTORY  04/26/2022    Colonoscopy     Social History  He reports that he has quit smoking. His smoking use included cigars. He has never used smokeless tobacco. He reports current alcohol use. He reports that he does not use drugs.    Family History  Family History   Problem Relation Name Age of Onset    ALS Mother      No Known Problems Father          Allergies  No Known Allergies  Review of Systems  Pre-sedation Evaluation:  ASA Classification - ASA 2 - Patient with mild systemic disease with no functional limitations  Mallampati Score - II (hard and soft palate, upper portion of tonsils and uvula visible)    Physical Exam  Vitals and nursing note reviewed.   Constitutional:       Appearance: Normal appearance.   HENT:      Head: Normocephalic.      Mouth/Throat:      Mouth: Mucous membranes are moist.      Pharynx: Oropharynx is clear.   Eyes:      Pupils: Pupils are equal, round, and reactive to light.   Cardiovascular:      Rate and Rhythm: Normal rate and regular rhythm.      Heart sounds: Normal heart sounds.   Pulmonary:      Effort: Pulmonary effort is normal.      Breath sounds: Normal breath sounds.   Abdominal:      General: Abdomen is flat. Bowel sounds are normal.      Palpations: Abdomen is soft. "   Musculoskeletal:         General: Normal range of motion.      Cervical back: Normal range of motion and neck supple.   Skin:     General: Skin is warm and dry.   Neurological:      General: No focal deficit present.      Mental Status: He is alert and oriented to person, place, and time.   Psychiatric:         Mood and Affect: Mood normal.         Behavior: Behavior normal.          Last Recorded Vitals  Blood pressure 125/77, pulse 78, temperature 36.2 °C (97.2 °F), temperature source Temporal, resp. rate 16, weight 91.2 kg (201 lb), SpO2 96%.     Assessment/Plan   Problem List Items Addressed This Visit       Dysphagia    Relevant Orders    Esophagogastroduodenoscopy (EGD)          PTA/Current Medications:  (Not in a hospital admission)    Current Outpatient Medications   Medication Sig Dispense Refill    multivitamin tablet Take 1 tablet by mouth once daily.      triamcinolone (Kenalog) 0.5 % cream Apply topically 3 times a day. 60 g 0    pantoprazole (ProtoNix) 40 mg EC tablet Take 1 tablet (40 mg) by mouth once daily. 90 tablet 3     Current Facility-Administered Medications   Medication Dose Route Frequency Provider Last Rate Last Admin    lactated Ringer's infusion  20 mL/hr intravenous Continuous Kain Levin DO 20 mL/hr at 07/30/24 1252 20 mL/hr at 07/30/24 1252     Kain Levin DO

## 2024-08-02 ENCOUNTER — OFFICE VISIT (OUTPATIENT)
Age: 54
End: 2024-08-02
Payer: COMMERCIAL

## 2024-08-02 VITALS
DIASTOLIC BLOOD PRESSURE: 80 MMHG | HEIGHT: 73 IN | WEIGHT: 207.8 LBS | OXYGEN SATURATION: 96 % | BODY MASS INDEX: 27.54 KG/M2 | HEART RATE: 82 BPM | SYSTOLIC BLOOD PRESSURE: 110 MMHG

## 2024-08-02 DIAGNOSIS — K21.9 GASTROESOPHAGEAL REFLUX DISEASE WITHOUT ESOPHAGITIS: ICD-10-CM

## 2024-08-02 DIAGNOSIS — J02.9 ACUTE PHARYNGITIS, UNSPECIFIED ETIOLOGY: Primary | ICD-10-CM

## 2024-08-02 RX ORDER — PREDNISONE 20 MG/1
40 TABLET ORAL DAILY
Qty: 10 TABLET | Refills: 0 | Status: SHIPPED | OUTPATIENT
Start: 2024-08-02 | End: 2024-08-07

## 2024-08-02 RX ORDER — PANTOPRAZOLE SODIUM 40 MG/1
40 TABLET, DELAYED RELEASE ORAL DAILY
Qty: 90 TABLET | Refills: 3 | Status: SHIPPED | OUTPATIENT
Start: 2024-08-02 | End: 2025-08-02

## 2024-08-02 RX ORDER — AZITHROMYCIN 250 MG/1
TABLET, FILM COATED ORAL
Qty: 6 TABLET | Refills: 0 | Status: SHIPPED | OUTPATIENT
Start: 2024-08-02 | End: 2024-08-07

## 2024-08-02 ASSESSMENT — ENCOUNTER SYMPTOMS
CONSTIPATION: 0
ABDOMINAL DISTENTION: 0
APPETITE CHANGE: 0
WHEEZING: 0
CHILLS: 0
HEADACHES: 0
FEVER: 0
LIGHT-HEADEDNESS: 0
RHINORRHEA: 0
ARTHRALGIAS: 0
DIFFICULTY URINATING: 0
DIARRHEA: 0
ADENOPATHY: 0
COUGH: 0
VOMITING: 0
NUMBNESS: 0
DIZZINESS: 0
SORE THROAT: 1
ABDOMINAL PAIN: 0
TROUBLE SWALLOWING: 0
SHORTNESS OF BREATH: 0
PALPITATIONS: 0
FATIGUE: 0
UNEXPECTED WEIGHT CHANGE: 0
NAUSEA: 0
ACTIVITY CHANGE: 0

## 2024-08-02 NOTE — PROGRESS NOTES
"Subjective   Patient ID: Speedy Gray II is a 54 y.o. male who presents for Sore Throat.    HPI   ST started 6 days ago, has not improved, + odynophagia, no RN/cough, no F/C/S, no N/V  OTC lozenges    Review of Systems   Constitutional:  Negative for activity change, appetite change, chills, fatigue, fever and unexpected weight change.   HENT:  Positive for sore throat. Negative for congestion, ear pain, nosebleeds, rhinorrhea, sneezing and trouble swallowing.    Respiratory:  Negative for cough, shortness of breath and wheezing.    Cardiovascular:  Negative for chest pain, palpitations and leg swelling.   Gastrointestinal:  Negative for abdominal distention, abdominal pain, constipation, diarrhea, nausea and vomiting.   Genitourinary:  Negative for difficulty urinating.   Musculoskeletal:  Negative for arthralgias.   Skin:  Negative for rash.   Neurological:  Negative for dizziness, light-headedness, numbness and headaches.   Hematological:  Negative for adenopathy.   Psychiatric/Behavioral:  Negative for behavioral problems.    All other systems reviewed and are negative.      Objective   /80   Pulse 82   Ht 1.854 m (6' 1\")   Wt 94.3 kg (207 lb 12.8 oz)   SpO2 96%   BMI 27.42 kg/m²     Physical Exam  Vitals and nursing note reviewed.   Constitutional:       Appearance: Normal appearance.   HENT:      Head: Normocephalic and atraumatic.      Right Ear: Tympanic membrane, ear canal and external ear normal.      Left Ear: Tympanic membrane, ear canal and external ear normal.      Nose: Nose normal.      Mouth/Throat:      Mouth: Mucous membranes are moist.      Pharynx: Oropharynx is clear. Posterior oropharyngeal erythema present.      Comments: Some ulcerations and erythema along the posterior tonsillar pillars, uvula and soft palate.  There is no cervical lymphadenopathy on examination.  Cardiovascular:      Rate and Rhythm: Normal rate and regular rhythm.      Pulses: Normal pulses.      Heart sounds: " Normal heart sounds.   Pulmonary:      Effort: Pulmonary effort is normal.      Breath sounds: Normal breath sounds.   Musculoskeletal:      Cervical back: Normal range of motion and neck supple.   Neurological:      Mental Status: He is alert.   Psychiatric:         Mood and Affect: Mood normal.         Behavior: Behavior normal.         Assessment/Plan   Problem List Items Addressed This Visit             ICD-10-CM    GERD (gastroesophageal reflux disease) K21.9    Relevant Medications    pantoprazole (ProtoNix) 40 mg EC tablet     Other Visit Diagnoses         Codes    Acute pharyngitis, unspecified etiology    -  Primary J02.9    Most likely viral, prednisone for 5 days, azithromycin if needed after 3 to 4 days, advised but over-the-counter     Relevant Medications    predniSONE (Deltasone) 20 mg tablet    azithromycin (Zithromax) 250 mg tablet

## 2024-08-05 LAB
LABORATORY COMMENT REPORT: NORMAL
PATH REPORT.FINAL DX SPEC: NORMAL
PATH REPORT.GROSS SPEC: NORMAL
PATH REPORT.TOTAL CANCER: NORMAL

## 2024-08-08 ENCOUNTER — TELEPHONE (OUTPATIENT)
Dept: GASTROENTEROLOGY | Facility: CLINIC | Age: 54
End: 2024-08-08
Payer: COMMERCIAL

## 2024-08-08 NOTE — TELEPHONE ENCOUNTER
Called Speedy and shared Dr. Levin's recommendations. Patient expressed understanding and made follow-up appointment for 12/5/24 at 1:00 pm.  Encouraged him to call if he has any other questions or concerns.

## 2024-10-04 ENCOUNTER — OFFICE VISIT (OUTPATIENT)
Age: 54
End: 2024-10-04
Payer: COMMERCIAL

## 2024-10-04 VITALS
SYSTOLIC BLOOD PRESSURE: 110 MMHG | BODY MASS INDEX: 27.43 KG/M2 | OXYGEN SATURATION: 98 % | HEART RATE: 63 BPM | HEIGHT: 73 IN | DIASTOLIC BLOOD PRESSURE: 80 MMHG | WEIGHT: 207 LBS

## 2024-10-04 DIAGNOSIS — R51.9 CHRONIC DAILY HEADACHE: ICD-10-CM

## 2024-10-04 DIAGNOSIS — R51.9 NONINTRACTABLE HEADACHE, UNSPECIFIED CHRONICITY PATTERN, UNSPECIFIED HEADACHE TYPE: Primary | ICD-10-CM

## 2024-10-04 PROCEDURE — 99214 OFFICE O/P EST MOD 30 MIN: CPT | Performed by: FAMILY MEDICINE

## 2024-10-04 PROCEDURE — 3008F BODY MASS INDEX DOCD: CPT | Performed by: FAMILY MEDICINE

## 2024-10-04 PROCEDURE — 4004F PT TOBACCO SCREEN RCVD TLK: CPT | Performed by: FAMILY MEDICINE

## 2024-10-04 RX ORDER — NORTRIPTYLINE HYDROCHLORIDE 10 MG/1
10 CAPSULE ORAL NIGHTLY
Qty: 30 CAPSULE | Refills: 5 | Status: SHIPPED | OUTPATIENT
Start: 2024-10-04 | End: 2025-04-02

## 2024-10-04 RX ORDER — PREDNISONE 10 MG/1
TABLET ORAL
Qty: 30 TABLET | Refills: 0 | Status: SHIPPED | OUTPATIENT
Start: 2024-10-04 | End: 2024-10-15

## 2024-10-04 ASSESSMENT — ENCOUNTER SYMPTOMS
ABDOMINAL DISTENTION: 0
ARTHRALGIAS: 0
CONSTIPATION: 0
NAUSEA: 0
WHEEZING: 0
HEADACHES: 1
ADENOPATHY: 0
NUMBNESS: 0
VOMITING: 0
SHORTNESS OF BREATH: 0
RHINORRHEA: 0
DIFFICULTY URINATING: 0
PALPITATIONS: 0
ABDOMINAL PAIN: 0
FATIGUE: 0
UNEXPECTED WEIGHT CHANGE: 0
DIARRHEA: 0
LIGHT-HEADEDNESS: 0
ACTIVITY CHANGE: 0
TROUBLE SWALLOWING: 0
APPETITE CHANGE: 0
DIZZINESS: 0
COUGH: 0

## 2024-10-04 NOTE — ASSESSMENT & PLAN NOTE
Neurology consultation was not much help, MRI scan was negative, continues to have daily headaches, advised to avoid over-the-counter nonsteroidals, only uses 1 cup of coffee a day, placed on prednisone taper along with nortriptyline 10 mg at at bedtime.  Recheck in the next 2 weeks, if not improving will refer to headache specialist.   Spoke to Pt regarding endodontist referral  Informed him how endodontic treatment is not covered by his insurance  Explained to him why referral was made  Provided him a numbers to local endodontist and their pricing information  Offered to mail his referral along endodontist information, he stated he would like a copy of each      Referral mailed with list of endo locations

## 2024-10-04 NOTE — PATIENT INSTRUCTIONS
Try to avoid over the counter medicines, increase fluids, be sure to keep sleep schedule and avoid caffeine, use the Prednisone in AM with food and start nortriptyline at night

## 2024-10-04 NOTE — PROGRESS NOTES
"Subjective   Patient ID: Speedy Gray II is a 54 y.o. male who presents for Persistent HA Pain Pressure.    HPI   Having headaches again, seen in March, sent to neurology and had MRI of the head, thought stress related, improved for a while  Headaches started again in the past 2 months, more frequent  Usually regular, same area, pain can last minutes to an hour, right temporal area  Sertraline made dizzy and fatigued  Some nausea and dizzy at times  Has sensation of something in throat   Has headaches up to a dozen a day and every day, gets worse as the day progresses, not as noticeable if active or distracted, more noticeable if relaxed or not busy  Dull pressure, no change in vision  Had seen optho and had Rx changed  Non-restful sleep, no energy at times through the day, + snoring noted by wife, not using CPAP as directed  Using NSAIDs frequently (daily)  Not always in the same spot of the head, moves around    Review of Systems   Constitutional:  Negative for activity change, appetite change, fatigue and unexpected weight change.   HENT:  Negative for ear pain, nosebleeds, rhinorrhea, sneezing and trouble swallowing.    Respiratory:  Negative for cough, shortness of breath and wheezing.    Cardiovascular:  Negative for chest pain, palpitations and leg swelling.   Gastrointestinal:  Negative for abdominal distention, abdominal pain, constipation, diarrhea, nausea and vomiting.   Genitourinary:  Negative for difficulty urinating.   Musculoskeletal:  Negative for arthralgias.   Skin:  Negative for rash.   Neurological:  Positive for headaches. Negative for dizziness, light-headedness and numbness.   Hematological:  Negative for adenopathy.   Psychiatric/Behavioral:  Negative for behavioral problems.    All other systems reviewed and are negative.      Objective   /80   Pulse 63   Ht 1.854 m (6' 1\")   Wt 93.9 kg (207 lb)   SpO2 98%   BMI 27.31 kg/m²     Physical Exam  Vitals and nursing note reviewed. "   Constitutional:       Appearance: Normal appearance.   HENT:      Head: Normocephalic and atraumatic.      Right Ear: Tympanic membrane, ear canal and external ear normal.      Left Ear: Tympanic membrane, ear canal and external ear normal.      Nose: Nose normal.      Mouth/Throat:      Mouth: Mucous membranes are moist.      Pharynx: Oropharynx is clear.   Cardiovascular:      Rate and Rhythm: Normal rate and regular rhythm.      Pulses: Normal pulses.      Heart sounds: Normal heart sounds.   Pulmonary:      Effort: Pulmonary effort is normal.      Breath sounds: Normal breath sounds.   Musculoskeletal:      Cervical back: Normal range of motion and neck supple.   Neurological:      General: No focal deficit present.      Mental Status: He is alert and oriented to person, place, and time. Mental status is at baseline.      Cranial Nerves: No cranial nerve deficit.      Sensory: No sensory deficit.      Motor: No weakness.      Coordination: Coordination normal.      Gait: Gait normal.      Deep Tendon Reflexes: Reflexes normal.   Psychiatric:         Mood and Affect: Mood normal.         Behavior: Behavior normal.         Assessment/Plan   Problem List Items Addressed This Visit             ICD-10-CM    Chronic daily headache R51.9     Neurology consultation was not much help, MRI scan was negative, continues to have daily headaches, advised to avoid over-the-counter nonsteroidals, only uses 1 cup of coffee a day, placed on prednisone taper along with nortriptyline 10 mg at at bedtime.  Recheck in the next 2 weeks, if not improving will refer to headache specialist.          Other Visit Diagnoses         Codes    Nonintractable headache, unspecified chronicity pattern, unspecified headache type    -  Primary R51.9    Relevant Medications    predniSONE (Deltasone) 10 mg tablet    nortriptyline (Pamelor) 10 mg capsule    Other Relevant Orders    Follow Up In Primary Care - Established

## 2024-10-17 ENCOUNTER — APPOINTMENT (OUTPATIENT)
Age: 54
End: 2024-10-17
Payer: COMMERCIAL

## 2024-10-17 VITALS
HEIGHT: 73 IN | HEART RATE: 76 BPM | WEIGHT: 205.2 LBS | DIASTOLIC BLOOD PRESSURE: 80 MMHG | OXYGEN SATURATION: 99 % | BODY MASS INDEX: 27.2 KG/M2 | SYSTOLIC BLOOD PRESSURE: 120 MMHG

## 2024-10-17 DIAGNOSIS — R51.9 CHRONIC DAILY HEADACHE: ICD-10-CM

## 2024-10-17 DIAGNOSIS — R51.9 NONINTRACTABLE HEADACHE, UNSPECIFIED CHRONICITY PATTERN, UNSPECIFIED HEADACHE TYPE: Primary | ICD-10-CM

## 2024-10-17 PROCEDURE — 99213 OFFICE O/P EST LOW 20 MIN: CPT | Performed by: FAMILY MEDICINE

## 2024-10-17 PROCEDURE — 4004F PT TOBACCO SCREEN RCVD TLK: CPT | Performed by: FAMILY MEDICINE

## 2024-10-17 PROCEDURE — 3008F BODY MASS INDEX DOCD: CPT | Performed by: FAMILY MEDICINE

## 2024-10-17 ASSESSMENT — ENCOUNTER SYMPTOMS
HEADACHES: 1
VOMITING: 0
FATIGUE: 0
PALPITATIONS: 0
DIZZINESS: 0
COUGH: 0
SHORTNESS OF BREATH: 0
DIARRHEA: 0
ABDOMINAL PAIN: 0
APPETITE CHANGE: 0
CONSTIPATION: 0
ACTIVITY CHANGE: 0
NAUSEA: 0
CHEST TIGHTNESS: 0
LIGHT-HEADEDNESS: 0

## 2024-10-17 NOTE — ASSESSMENT & PLAN NOTE
She has become improved after prednisone taper and adding nortriptyline at night.  Continue with current treatment plan, avoid excessive amounts of nonsteroidal anti-inflammatories, was advised about headache triggers, if headaches increase or recur will refer to headache specialist.

## 2024-10-17 NOTE — PROGRESS NOTES
"Subjective   Patient ID: Speedy Gray II is a 54 y.o. male who presents for 2 WK CHeck.    HPI   Headaches some better, 1-2 a day on average, had a couple of bad headaches in the past 2 days  +improvement, no nausea or dizziness  Occ dry mouth at night at times   Still has sensation in throat/swallowing  Headaches come and go, last only minutes, sharp pain (not an ice pick type sensation), not disabling      Review of Systems   Constitutional:  Negative for activity change, appetite change and fatigue.   HENT:  Negative for congestion.    Respiratory:  Negative for cough, chest tightness and shortness of breath.    Cardiovascular:  Negative for chest pain, palpitations and leg swelling.   Gastrointestinal:  Negative for abdominal pain, constipation, diarrhea, nausea and vomiting.   Neurological:  Positive for headaches. Negative for dizziness and light-headedness.       Objective   /80   Pulse 76   Ht 1.854 m (6' 1\")   Wt 93.1 kg (205 lb 3.2 oz)   SpO2 99%   BMI 27.07 kg/m²     Physical Exam  Vitals and nursing note reviewed.   Constitutional:       Appearance: Normal appearance.   HENT:      Head: Normocephalic and atraumatic.      Right Ear: Tympanic membrane, ear canal and external ear normal.      Left Ear: Tympanic membrane, ear canal and external ear normal.      Nose: Nose normal.      Mouth/Throat:      Mouth: Mucous membranes are moist.      Pharynx: Oropharynx is clear.   Cardiovascular:      Rate and Rhythm: Normal rate and regular rhythm.      Pulses: Normal pulses.      Heart sounds: Normal heart sounds.   Pulmonary:      Effort: Pulmonary effort is normal.      Breath sounds: Normal breath sounds.   Musculoskeletal:      Cervical back: Normal range of motion and neck supple.   Neurological:      General: No focal deficit present.      Mental Status: He is alert and oriented to person, place, and time. Mental status is at baseline.   Psychiatric:         Mood and Affect: Mood normal.         " Behavior: Behavior normal.         Assessment/Plan   Problem List Items Addressed This Visit             ICD-10-CM    Chronic daily headache R51.9     She has become improved after prednisone taper and adding nortriptyline at night.  Continue with current treatment plan, avoid excessive amounts of nonsteroidal anti-inflammatories, was advised about headache triggers, if headaches increase or recur will refer to headache specialist.          Other Visit Diagnoses         Codes    Nonintractable headache, unspecified chronicity pattern, unspecified headache type    -  Primary R51.9

## 2024-10-17 NOTE — PATIENT INSTRUCTIONS
Consider magnesium supplementation, if headaches increase, call and we will have you see a headache specialist

## 2024-11-07 ENCOUNTER — TELEPHONE (OUTPATIENT)
Age: 54
End: 2024-11-07
Payer: COMMERCIAL

## 2024-11-07 DIAGNOSIS — R51.9 CHRONIC DAILY HEADACHE: ICD-10-CM

## 2024-11-07 DIAGNOSIS — R51.9 NONINTRACTABLE HEADACHE, UNSPECIFIED CHRONICITY PATTERN, UNSPECIFIED HEADACHE TYPE: ICD-10-CM

## 2024-11-07 NOTE — TELEPHONE ENCOUNTER
Pt called asking if he could get the neuro referral for his persistent Has. Which neuro would you recommend? I can place order just wasn't sure if you had one in mind

## 2024-11-07 NOTE — TELEPHONE ENCOUNTER
He had seen neurology at [facility], this evaluation did not go well for his headaches.  I would like to try to place consultation for him to visit with headache specialist at [facility] ([place]?)

## 2024-12-05 ENCOUNTER — APPOINTMENT (OUTPATIENT)
Dept: GASTROENTEROLOGY | Facility: CLINIC | Age: 54
End: 2024-12-05
Payer: COMMERCIAL

## 2024-12-05 VITALS
BODY MASS INDEX: 27.3 KG/M2 | HEIGHT: 73 IN | OXYGEN SATURATION: 98 % | WEIGHT: 206 LBS | RESPIRATION RATE: 16 BRPM | SYSTOLIC BLOOD PRESSURE: 124 MMHG | HEART RATE: 105 BPM | DIASTOLIC BLOOD PRESSURE: 74 MMHG

## 2024-12-05 DIAGNOSIS — K21.9 GASTROESOPHAGEAL REFLUX DISEASE WITHOUT ESOPHAGITIS: Primary | ICD-10-CM

## 2024-12-05 PROCEDURE — 4004F PT TOBACCO SCREEN RCVD TLK: CPT | Performed by: INTERNAL MEDICINE

## 2024-12-05 PROCEDURE — 99213 OFFICE O/P EST LOW 20 MIN: CPT | Performed by: INTERNAL MEDICINE

## 2024-12-05 PROCEDURE — 3008F BODY MASS INDEX DOCD: CPT | Performed by: INTERNAL MEDICINE

## 2024-12-05 RX ORDER — PANTOPRAZOLE SODIUM 40 MG/1
40 TABLET, DELAYED RELEASE ORAL DAILY
Qty: 90 TABLET | Refills: 3 | Status: SHIPPED | OUTPATIENT
Start: 2024-12-05 | End: 2025-12-05

## 2024-12-05 ASSESSMENT — ENCOUNTER SYMPTOMS
GASTROINTESTINAL NEGATIVE: 1
RESPIRATORY NEGATIVE: 1
NEUROLOGICAL NEGATIVE: 1
PSYCHIATRIC NEGATIVE: 1
MUSCULOSKELETAL NEGATIVE: 1
HEMATOLOGIC/LYMPHATIC NEGATIVE: 1
CONSTITUTIONAL NEGATIVE: 1
CARDIOVASCULAR NEGATIVE: 1
EYES NEGATIVE: 1
ENDOCRINE NEGATIVE: 1

## 2024-12-05 NOTE — PROGRESS NOTES
"Subjective   Patient ID: Smith Gray II \"Speedy\" is a 54 y.o. male who presents for GERD (Pt states that he has felt fine since last visit. States that he is still having swallowing problems (feels like something is stuck in the back of his throat). States that this feeling is constant, worse when he eats dry foods. Occasionally coughs up. Hasn't noticed issues when drinking, but has had difficulty taking medications. States that he has continued to take protonix and that has provided relief for the reflux symptoms.  ).  HPI dysphagia.  Reflux symptoms totally resolved with Protonix.  He is still having intermittent dysphagia to solids.  Particular with dry foods such as chicken and bread and rice.  Biopsies from midesophagus failed to show any eosinophilic esophagitis.  Distal esophageal biopsies showed reflux changes and H. pylori testing was negative.    Review of Systems   Constitutional: Negative.    HENT: Negative.     Eyes: Negative.    Respiratory: Negative.     Cardiovascular: Negative.    Gastrointestinal: Negative.    Endocrine: Negative.    Genitourinary: Negative.    Musculoskeletal: Negative.    Neurological: Negative.    Hematological: Negative.    Psychiatric/Behavioral: Negative.         Objective   Physical Exam  Vitals and nursing note reviewed.   Constitutional:       Appearance: Normal appearance.   HENT:      Head: Normocephalic.      Mouth/Throat:      Mouth: Mucous membranes are moist.      Pharynx: Oropharynx is clear.   Eyes:      Pupils: Pupils are equal, round, and reactive to light.   Cardiovascular:      Rate and Rhythm: Normal rate and regular rhythm.      Heart sounds: Normal heart sounds.   Pulmonary:      Effort: Pulmonary effort is normal.      Breath sounds: Normal breath sounds.   Abdominal:      General: Abdomen is flat. Bowel sounds are normal.      Palpations: Abdomen is soft.   Musculoskeletal:         General: Normal range of motion.      Cervical back: Normal range of " motion and neck supple.   Skin:     General: Skin is warm and dry.   Neurological:      General: No focal deficit present.      Mental Status: He is alert and oriented to person, place, and time.   Psychiatric:         Mood and Affect: Mood normal.         Behavior: Behavior normal.         Assessment/Plan   Diagnoses and all orders for this visit:  Gastroesophageal reflux disease without esophagitis  -     pantoprazole (ProtoNix) 40 mg EC tablet; Take 1 tablet (40 mg) by mouth once daily.           Kain Levin DO 12/05/24 1:13 PM

## 2025-02-12 ENCOUNTER — EVALUATION (OUTPATIENT)
Dept: PHYSICAL THERAPY | Facility: CLINIC | Age: 55
End: 2025-02-12
Payer: COMMERCIAL

## 2025-02-12 DIAGNOSIS — M54.2 NECK PAIN: ICD-10-CM

## 2025-02-12 DIAGNOSIS — R51.9 HEAD PAIN: Primary | ICD-10-CM

## 2025-02-12 PROCEDURE — 97161 PT EVAL LOW COMPLEX 20 MIN: CPT | Mod: GP | Performed by: PHYSICAL THERAPIST

## 2025-02-12 PROCEDURE — 97110 THERAPEUTIC EXERCISES: CPT | Mod: GP | Performed by: PHYSICAL THERAPIST

## 2025-02-12 ASSESSMENT — ENCOUNTER SYMPTOMS
LOSS OF SENSATION IN FEET: 0
DEPRESSION: 0
OCCASIONAL FEELINGS OF UNSTEADINESS: 0

## 2025-02-12 ASSESSMENT — PATIENT HEALTH QUESTIONNAIRE - PHQ9
2. FEELING DOWN, DEPRESSED OR HOPELESS: NOT AT ALL
1. LITTLE INTEREST OR PLEASURE IN DOING THINGS: NOT AT ALL
SUM OF ALL RESPONSES TO PHQ9 QUESTIONS 1 AND 2: 0

## 2025-02-12 ASSESSMENT — PAIN SCALES - GENERAL: PAINLEVEL_OUTOF10: 1

## 2025-02-12 ASSESSMENT — PAIN - FUNCTIONAL ASSESSMENT: PAIN_FUNCTIONAL_ASSESSMENT: 0-10

## 2025-02-12 NOTE — PROGRESS NOTES
"Physical Therapy Evaluation and Treatment      Patient Name: Smith Gray II \"Speedy\"  MRN: [id number]  Today's Date: 2/12/2025  Time Calculation  Start Time: 0835  Stop Time: 0915  Time Calculation (min): 40 min    PT Evaluation Time Entry  PT Evaluation (Low) Time Entry: 29   PT Therapeutic Procedures Time Entry  Therapeutic Exercise Time Entry: 10                         Assessment:  PT Assessment Results: Decreased strength, Decreased range of motion, Decreased mobility, Pain  Rehab Prognosis: Good  Barriers to Participation:  (None)    The pt presents with Medical Dx of Head Pain, Neck Pain.  Pt presents with increased pain/HA, decreased strength, ROM/flexibility and functional mobility.  Pt would benefit from PT services from PT services in order to improve on these deficits and to maximize ability to reach out with UE, scan while driving, to reach UE overhead, lift objects overhead and functional activity/mobility.      Plan:  Treatment/Interventions: Cryotherapy, Education/ Instruction, Dry needling, Electrical stimulation, Hot pack, Manual therapy, Mechanical traction, Therapeutic exercises (Manual Therapy including IASTM and cupping as needed.)  PT Plan: Skilled PT  PT Frequency:  (2x/wk for 4 weeks or 9 sessions)  Rehab Potential: Good  Plan of Care Agreement: Patient (Patient Goal:  Improve HA frequency/occurence)    Current Problem:   Problem List Items Addressed This Visit             ICD-10-CM    Head pain - Primary R51.9    Relevant Orders    Follow Up In Physical Therapy    Neck pain M54.2    Relevant Orders    Follow Up In Physical Therapy       Subjective   The pt has had HA's intermittently for over a year now. No mechanism of injury was reported.  Pt reports that the HA/head pain is switches side to side and that he also has some pain at the base of his neck.  Pt that the pain seems to not be tied to any activity but does seem to be worse by the end of the day. Pain is intermittent and comes & " goes with activity.  Pt reports that it occasionally interrupts sleep but that this is not common.  Pt reports that cold/ice seems to help as well as over the counter medication but that it does not completely take the pain away.     General  Reason for Referral: head pain  Referred By: Maricarmen Parekh DO  General Comment: Visit # 1/9  Visit #1    Precautions:  Precautions  Precautions Comment: No Fall Risk.  PMH:  headaches, hx of lumbar discectomy/laminectomy (no fusion), hx of R knee scope.    Pain:  Pain Assessment  Pain Assessment: 0-10  0-10 (Numeric) Pain Score: 1 (head ache pain range 1-5/10)  Pain Type: Chronic pain  Pain Location:  (Head ache)    Home Living:   Pt lives with his wife in a 2 story home.  1 flt of stairs with rail.  No concerns on living set up.     Prior Level of Function:   Pt was I with all ADL's and IADL's prior.   at [facility].      Objective   Observation:  FHP with rounded shoulders.    Palpation:  Mild tenderness at C6-7 at base of neck.      Sensation:  Intact to light touch b/l UE's    Cervical AROM:     R-   Flex  No Pimentel       Ext  No Pimentel       SB   Pimentel 15%      Rot   Pimentel  15%     L-      SB   Pimentel 15%      Rot   Pimentel 15%                                                                                                           Myotomes/Strength:  Cervical Flex    4+/5                                       L  Shoulder Flex    5/5                                      Cervical Ext     4+/5                                      R   Shoulder Flex    5/5                                   Cervical R SB     4+/5                                                  L  Tricept     5/5                                   Cervical L SB      4+/5                                                 R  Tricept     5/5                                                R UT      4+/5                                                 L  Bicept      5/5                                                 L UT      4+/5                                                 R  Bicept      5/5                                                                                                               L Wrist Ext/Flex    5/5                                                                                                               R Wrist Ext/Flex    5/5                                                                                                                                                                                                                                                                                                                                              Special Tests:     Alar Ligament Test  (-)                             Sharps Elicia Test  (-)                          Vertebral Artery Test  (-)               Spurlings Compression Test  (-)                                   Distraction Test   (-)    Outcome Measures:  Other Measures  TMD Disability Index: 12     Treatments:  Ther Ex:  10 min  1 unit  Mid Trap/Rhomboid Stretch  20 sec hold x 2  B/L UT Stretch   20 sec hold x 2 ea  B/L Cervical Rotation Stretch  20 sec hold x 2 ea  Doorway Anterior Shoulder/Pec Stretch   20 sec hold x 2  HEP instruction with handout given      Manual:   STM to neck paraspinals, B UT and scapular muscles with manual cervical traction.  15 min   Add next        OP EDUCATION:   PT edu pt regarding PT POC/course of treatment and HEP.  Pt was given exercise handout as a reference.  Pt verbalized good understanding.          Goals:  Active       PT Problem       PT Goal 1       Start:  02/12/25    Expected End:  03/26/25       LTG's:  1) Improve neck and/or UT strength  to >= 5/5 throughout  in order to be able to work around the home/farm without significant limitation.     4-6 weeks      2) Improve cervical AROM from Pimentel 15% B SB and Rot to No Significant Limitation in all limited planes in order to facilitate  improved ability to scan with driving or look overhead.     4-6 weeks      3) Improve cervical/Head pain from   5/10 to <= 0-3/10 with activity in order to improve QOL.       4-6 weeks      4) Improve TMD index score by >= 5 points in order to improve QOL..       4-6 weeks      5) Pt will be able to scan with driving, lift/carry objects, perform overhead activity and return to exercise, and work around the home and/or on the job without significant limitation.        4-6 weeks      ST) Pt/caregiver will be I and consistent with HEP with use of handout as needed in order to maximize strength and flexibility.       2-3 weeks

## 2025-02-12 NOTE — LETTER
"February 12, 2025    Maricarmen Parekh DO  [address] [facility] [place] [address]    Patient: Speedy Gray II   YOB: 1970   Date of Visit: 2/12/2025       Dear Maricarmen Parekh DO  [address] [place] [address]    The attached plan of care is being sent to you because your patient’s medical reimbursement requires that you certify the plan of care. Your signature is required to allow uninterrupted insurance coverage.      You may indicate your approval by signing below and faxing this form back to us at Dept Fax: [phone].    Please call Dept: [phone] with any questions or concerns.    Thank you for this referral,        Jose Calderon, PT  [address] [address]    Payer: Payor: [employer] [place] / Plan: [employer] / Product Type: *No Product type* /                                                                         Date:     Dear Jose Calderon, PT,     Re: Mr. Smith Gray, MRN:[id number]    I certify that I have reviewed the attached plan of care and it is medically necessary for Mr. Smith Gray (1970) who is under my care.          ______________________________________                    _________________  Provider name and credentials                                           Date and time                                                                                           Plan of Care 2/12/25   Effective from: 2/12/2025  Effective to: 3/26/2025    Plan ID: [id number]            Participants as of Finalize on 2/12/2025    Name Type Comments Contact Info    Maricarmen Praekh DO Consulting Physician  [phone]    Jose Calderon PT Physical Therapist  [phone]       Last Plan Note     Author: Jose Calderon PT Status: Signed Last edited: 2/12/2025  8:30 AM       Physical Therapy Evaluation and Treatment      Patient Name: Smith Gray II \"Speedy\"  MRN: [id number]  Today's Date: " 2/12/2025  Time Calculation  Start Time: 0835  Stop Time: 0915  Time Calculation (min): 40 min    PT Evaluation Time Entry  PT Evaluation (Low) Time Entry: 29   PT Therapeutic Procedures Time Entry  Therapeutic Exercise Time Entry: 10                         Assessment:  PT Assessment Results: Decreased strength, Decreased range of motion, Decreased mobility, Pain  Rehab Prognosis: Good  Barriers to Participation:  (None)    The pt presents with Medical Dx of Head Pain, Neck Pain.  Pt presents with increased pain/HA, decreased strength, ROM/flexibility and functional mobility.  Pt would benefit from PT services from PT services in order to improve on these deficits and to maximize ability to reach out with UE, scan while driving, to reach UE overhead, lift objects overhead and functional activity/mobility.      Plan:  Treatment/Interventions: Cryotherapy, Education/ Instruction, Dry needling, Electrical stimulation, Hot pack, Manual therapy, Mechanical traction, Therapeutic exercises (Manual Therapy including IASTM and cupping as needed.)  PT Plan: Skilled PT  PT Frequency:  (2x/wk for 4 weeks or 9 sessions)  Rehab Potential: Good  Plan of Care Agreement: Patient (Patient Goal:  Improve HA frequency/occurence)    Current Problem:   Problem List Items Addressed This Visit             ICD-10-CM    Head pain - Primary R51.9    Relevant Orders    Follow Up In Physical Therapy    Neck pain M54.2    Relevant Orders    Follow Up In Physical Therapy       Subjective   The pt has had HA's intermittently for over a year now. No mechanism of injury was reported.  Pt reports that the HA/head pain is switches side to side and that he also has some pain at the base of his neck.  Pt that the pain seems to not be tied to any activity but does seem to be worse by the end of the day. Pain is intermittent and comes & goes with activity.  Pt reports that it occasionally interrupts sleep but that this is not common.  Pt reports that  cold/ice seems to help as well as over the counter medication but that it does not completely take the pain away.     General  Reason for Referral: head pain  Referred By: Maricarmen Parekh DO  General Comment: Visit # 1/9  Visit #1    Precautions:  Precautions  Precautions Comment: No Fall Risk.  PMH:  headaches, hx of lumbar discectomy/laminectomy (no fusion), hx of R knee scope.    Pain:  Pain Assessment  Pain Assessment: 0-10  0-10 (Numeric) Pain Score: 1 (head ache pain range 1-5/10)  Pain Type: Chronic pain  Pain Location:  (Head ache)    Home Living:   Pt lives with his wife in a 2 story home.  1 flt of stairs with rail.  No concerns on living set up.     Prior Level of Function:   Pt was I with all ADL's and IADL's prior.   at [facility].      Objective   Observation:  FHP with rounded shoulders.    Palpation:  Mild tenderness at C6-7 at base of neck.      Sensation:  Intact to light touch b/l UE's    Cervical AROM:     R-   Flex  No Pimentel       Ext  No Pimentel       SB   Pimentel 15%      Rot   Pimentel  15%     L-      SB   Pimentel 15%      Rot   Pimentel 15%                                                                                                           Myotomes/Strength:  Cervical Flex    4+/5                                       L  Shoulder Flex    5/5                                      Cervical Ext     4+/5                                      R   Shoulder Flex    5/5                                   Cervical R SB     4+/5                                                  L  Tricept     5/5                                   Cervical L SB      4+/5                                                 R  Tricept     5/5                                                R UT      4+/5                                                 L  Bicept      5/5                                                L UT      4+/5                                                 R  Bicept      5/5                                                                                                                L Wrist Ext/Flex    5/5                                                                                                               R Wrist Ext/Flex    5/5                                                                                                                                                                                                                                                                                                                                              Special Tests:     Alar Ligament Test  (-)                             Sharps Elicia Test  (-)                          Vertebral Artery Test  (-)               Spurlings Compression Test  (-)                                   Distraction Test   (-)    Outcome Measures:  Other Measures  TMD Disability Index: 12     Treatments:  Ther Ex:  10 min  1 unit  Mid Trap/Rhomboid Stretch  20 sec hold x 2  B/L UT Stretch   20 sec hold x 2 ea  B/L Cervical Rotation Stretch  20 sec hold x 2 ea  Doorway Anterior Shoulder/Pec Stretch   20 sec hold x 2  HEP instruction with handout given      Manual:   STM to neck paraspinals, B UT and scapular muscles with manual cervical traction.  15 min   Add next        OP EDUCATION:   PT edu pt regarding PT POC/course of treatment and HEP.  Pt was given exercise handout as a reference.  Pt verbalized good understanding.          Goals:  Active       PT Problem       PT Goal 1       Start:  02/12/25    Expected End:  03/26/25       LTG's:  1) Improve neck and/or UT strength  to >= 5/5 throughout  in order to be able to work around the home/farm without significant limitation.     4-6 weeks      2) Improve cervical AROM from Pimentel 15% B SB and Rot to No Significant Limitation in all limited planes in order to facilitate improved ability to scan with driving or look overhead.     4-6 weeks      3) Improve cervical/Head pain from   5/10 to  <= 0-3/10 with activity in order to improve QOL.       4-6 weeks      4) Improve TMD index score by >= 5 points in order to improve QOL..       4-6 weeks      5) Pt will be able to scan with driving, lift/carry objects, perform overhead activity and return to exercise, and work around the home and/or on the job without significant limitation.        4-6 weeks      ST) Pt/caregiver will be I and consistent with HEP with use of handout as needed in order to maximize strength and flexibility.       2-3 weeks                            Current Participants as of 2025    Name Type Comments Contact Info    Maricarmen Parekh DO Consulting Physician  [phone]    Signature pending    Jose Calderon, PT Physical Therapist  [phone]

## 2025-02-17 ENCOUNTER — TREATMENT (OUTPATIENT)
Dept: PHYSICAL THERAPY | Facility: CLINIC | Age: 55
End: 2025-02-17
Payer: COMMERCIAL

## 2025-02-17 DIAGNOSIS — R51.9 HEAD PAIN: ICD-10-CM

## 2025-02-17 DIAGNOSIS — M54.2 NECK PAIN: ICD-10-CM

## 2025-02-17 PROCEDURE — 97012 MECHANICAL TRACTION THERAPY: CPT | Mod: GP | Performed by: PHYSICAL THERAPIST

## 2025-02-17 PROCEDURE — 97140 MANUAL THERAPY 1/> REGIONS: CPT | Mod: GP | Performed by: PHYSICAL THERAPIST

## 2025-02-17 PROCEDURE — 97110 THERAPEUTIC EXERCISES: CPT | Mod: GP | Performed by: PHYSICAL THERAPIST

## 2025-02-17 ASSESSMENT — PAIN SCALES - GENERAL: PAINLEVEL_OUTOF10: 1

## 2025-02-17 ASSESSMENT — PAIN - FUNCTIONAL ASSESSMENT: PAIN_FUNCTIONAL_ASSESSMENT: 0-10

## 2025-02-17 NOTE — PROGRESS NOTES
Physical Therapy Treatment    Patient Name: Smith Gray II  MRN: [id number]  Today's Date: 2/17/2025  Time Calculation  Start Time: 0835  Stop Time: 0915  Time Calculation (min): 40 min      PT Therapeutic Procedures Time Entry  Manual Therapy Time Entry: 14  Therapeutic Exercise Time Entry: 10   PT Modalities Time Entry  Mechanical Traction Time Entry: 15                     General  Reason for Referral: head pain  Referred By: Maricarmen Parekh DO  General Comment: Visit # 2/9  Visit #2    Assessment:  Pt had good overall tolerance to exercises performed in treatment today.   Good form with stretches completed today with minimal cueing needed.  Pt reported that manual therapy and mechanical cervical traction felt good today.        Plan:  OP PT Plan  Treatment/Interventions: Cryotherapy, Education/ Instruction, Dry needling, Electrical stimulation, Hot pack, Manual therapy, Mechanical traction, Therapeutic exercises (Manual Therapy including IASTM and cupping as needed.)  PT Plan: Skilled PT  PT Frequency:  (2x/wk for 4 weeks or 9 sessions)  Rehab Potential: Good  Plan of Care Agreement: Patient (Patient Goal:  Improve HA frequency/occurence)     Continue to progress with mechanical cervical traction, manual therapy and flexibility/strength ex's as able in order to be able to work around the home and on the job without significant pain/limitation.      Current Problem  Problem List Items Addressed This Visit             ICD-10-CM    Head pain R51.9    Neck pain M54.2       Subjective  Pt reports that he feels ok so far today.  Minimal if any head pain currently.      Precautions  Precautions  Precautions Comment: No Fall Risk.  PMH:  headaches, hx of lumbar discectomy/laminectomy (no fusion), hx of R knee scope.    Pain  Pain Assessment: 0-10  0-10 (Numeric) Pain Score: 1  Pain Type: Chronic pain  Pain Location:  (head pain)      Treatments:  Ther Ex:  10 min  1 unit  UBE  Lv 1  6 min  Fwd/Backward  Doorway Anterior  Shoulder/Pec Stretch   20 sec hold x 2  Mid Trap/Rhomboid Stretch  20 sec hold x 2  B/L UT Stretch   20 sec hold x 2 ea  X  B/L Cervical Rotation Stretch  20 sec hold x 2 ea        Manual: 14 min   1 unit  STM to neck paraspinals, B UT and scapular muscles with manual cervical traction.  15 min   Add next      Modalities:  15 min   1 unit  Mechanical Cervical Traction  30# max / 15# min   15 min          OP EDUCATION:   PT edu pt regarding PT POC/course of treatment and HEP. Pt was given exercise handout as a reference. Pt verbalized good understanding.       Goals:  Active       PT Problem       PT Goal 1       Start:  25    Expected End:  25       LTG's:  1) Improve neck and/or UT strength  to >= 5/5 throughout  in order to be able to work around the home/farm without significant limitation.     4-6 weeks      2) Improve cervical AROM from Pimentel 15% B SB and Rot to No Significant Limitation in all limited planes in order to facilitate improved ability to scan with driving or look overhead.     4-6 weeks      3) Improve cervical/Head pain from   5/10 to <= 0-3/10 with activity in order to improve QOL.       4-6 weeks      4) Improve TMD index score by >= 5 points in order to improve QOL..       4-6 weeks      5) Pt will be able to scan with driving, lift/carry objects, perform overhead activity and return to exercise, and work around the home and/or on the job without significant limitation.        4-6 weeks      ST) Pt/caregiver will be I and consistent with HEP with use of handout as needed in order to maximize strength and flexibility.       2-3 weeks

## 2025-02-20 ENCOUNTER — TREATMENT (OUTPATIENT)
Dept: PHYSICAL THERAPY | Facility: CLINIC | Age: 55
End: 2025-02-20
Payer: COMMERCIAL

## 2025-02-20 DIAGNOSIS — R51.9 HEAD PAIN: ICD-10-CM

## 2025-02-20 DIAGNOSIS — M54.2 NECK PAIN: ICD-10-CM

## 2025-02-20 PROCEDURE — 97140 MANUAL THERAPY 1/> REGIONS: CPT | Mod: GP | Performed by: PHYSICAL THERAPIST

## 2025-02-20 PROCEDURE — 97110 THERAPEUTIC EXERCISES: CPT | Mod: GP | Performed by: PHYSICAL THERAPIST

## 2025-02-20 PROCEDURE — 97012 MECHANICAL TRACTION THERAPY: CPT | Mod: GP | Performed by: PHYSICAL THERAPIST

## 2025-02-20 ASSESSMENT — PAIN - FUNCTIONAL ASSESSMENT: PAIN_FUNCTIONAL_ASSESSMENT: 0-10

## 2025-02-20 ASSESSMENT — PAIN SCALES - GENERAL: PAINLEVEL_OUTOF10: 3

## 2025-02-20 NOTE — PROGRESS NOTES
Physical Therapy Treatment    Patient Name: Smith Gray II  MRN: [id number]  Today's Date: 2/20/2025  Time Calculation  Start Time: 0833  Stop Time: 0915  Time Calculation (min): 42 min      PT Therapeutic Procedures Time Entry  Manual Therapy Time Entry: 14  Therapeutic Exercise Time Entry: 10   PT Modalities Time Entry  Mechanical Traction Time Entry: 15                     General  Reason for Referral: head pain  Referred By: Maricarmen Parekh DO  General Comment: Visit # 3/9  Visit #3    Assessment:  Pt had good overall tolerance to exercises performed in treatment today.  Good form with new strength ex's completed.        Plan:  OP PT Plan  Treatment/Interventions: Cryotherapy, Education/ Instruction, Dry needling, Electrical stimulation, Hot pack, Manual therapy, Mechanical traction, Therapeutic exercises (Manual Therapy including IASTM and cupping as needed.)  PT Plan: Skilled PT  PT Frequency:  (2x/wk for 4 weeks or 9 sessions)  Rehab Potential: Good  Plan of Care Agreement: Patient (Patient Goal:  Improve HA frequency/occurence)    Continue to progress with mechanical cervical traction, manual therapy and flexibility/strength ex's as able in order to be able to work around the home and on the job without significant pain/limitation.     Current Problem  Problem List Items Addressed This Visit             ICD-10-CM    Head pain R51.9    Neck pain M54.2       Subjective  Pt reports that he felt pretty good for the day after his last PT rx session but that he has had some head aches since.  Pt reports that the mechanical cervical traction felt pretty good last session.    Precautions  Precautions  Precautions Comment: No Fall Risk.  PMH:  headaches, hx of lumbar discectomy/laminectomy (no fusion), hx of R knee scope.    Pain  Pain Assessment: 0-10  0-10 (Numeric) Pain Score: 3  Pain Location: Head      Treatments:  Ther Ex:  10 min  1 unit  UBE  Lv 1  5 min  Fwd/Backward  Resistive Rowing  Magenta Tubing   2 x  10 reps  N  Resistive Shoulder Extension   Magenta Tubing   2 x 10 reps  N  Doorway Anterior Shoulder/Pec Stretch   20 sec hold x 2  Mid Trap/Rhomboid Stretch  20 sec hold x 2  X  B/L UT Stretch   20 sec hold x 2 ea  X  B/L Cervical Rotation Stretch  20 sec hold x 2 ea  HEP instruction with handout given.  Gave pt band set up for home use and handout.        Manual: 14 min   1 unit  STM to neck paraspinals, B UT and scapular muscles with manual cervical traction.  14 min           Modalities:  15 min   1 unit  Mechanical Cervical Traction  30# max / 15# min   15 min           OP EDUCATION:   PT edu pt regarding PT POC/course of treatment and HEP. Pt was given exercise handout as a reference. Pt verbalized good understanding.       Goals:  Active       PT Problem       PT Goal 1       Start:  25    Expected End:  25       LTG's:  1) Improve neck and/or UT strength  to >= 5/5 throughout  in order to be able to work around the home/farm without significant limitation.     4-6 weeks      2) Improve cervical AROM from Pimentel 15% B SB and Rot to No Significant Limitation in all limited planes in order to facilitate improved ability to scan with driving or look overhead.     4-6 weeks      3) Improve cervical/Head pain from   5/10 to <= 0-3/10 with activity in order to improve QOL.       4-6 weeks      4) Improve TMD index score by >= 5 points in order to improve QOL..       4-6 weeks      5) Pt will be able to scan with driving, lift/carry objects, perform overhead activity and return to exercise, and work around the home and/or on the job without significant limitation.        4-6 weeks      ST) Pt/caregiver will be I and consistent with HEP with use of handout as needed in order to maximize strength and flexibility.       2-3 weeks

## 2025-02-24 ENCOUNTER — TREATMENT (OUTPATIENT)
Dept: PHYSICAL THERAPY | Facility: CLINIC | Age: 55
End: 2025-02-24
Payer: COMMERCIAL

## 2025-02-24 DIAGNOSIS — M54.2 NECK PAIN: ICD-10-CM

## 2025-02-24 DIAGNOSIS — R51.9 HEAD PAIN: ICD-10-CM

## 2025-02-24 PROCEDURE — 97140 MANUAL THERAPY 1/> REGIONS: CPT | Mod: GP | Performed by: PHYSICAL THERAPIST

## 2025-02-24 PROCEDURE — 97012 MECHANICAL TRACTION THERAPY: CPT | Mod: GP | Performed by: PHYSICAL THERAPIST

## 2025-02-24 PROCEDURE — 97110 THERAPEUTIC EXERCISES: CPT | Mod: GP | Performed by: PHYSICAL THERAPIST

## 2025-02-24 ASSESSMENT — PAIN - FUNCTIONAL ASSESSMENT: PAIN_FUNCTIONAL_ASSESSMENT: 0-10

## 2025-02-24 ASSESSMENT — PAIN SCALES - GENERAL: PAINLEVEL_OUTOF10: 4

## 2025-02-24 NOTE — PROGRESS NOTES
Physical Therapy Treatment    Patient Name: Smith Gray II  MRN: [id number]  Today's Date: 2/24/2025  Time Calculation  Start Time: 0915  Stop Time: 0958  Time Calculation (min): 43 min      PT Therapeutic Procedures Time Entry  Manual Therapy Time Entry: 14  Therapeutic Exercise Time Entry: 10   PT Modalities Time Entry  Mechanical Traction Time Entry: 15                     General  Reason for Referral: head pain  Referred By: Maricarmen Parekh DO  General Comment: Visit # 4/9  Visit #4    Assessment:  Good form with strength ex's performed this morning without cueing needed.  Pt reports that the manual therapy and mechanical traction took away the HA that he had when he comes in and that he will monitor it closely to day to see how long the carry over is.        Plan:  OP PT Plan  Treatment/Interventions: Cryotherapy, Education/ Instruction, Dry needling, Electrical stimulation, Hot pack, Manual therapy, Mechanical traction, Therapeutic exercises (Manual Therapy including IASTM and cupping as needed.)  PT Plan: Skilled PT  PT Frequency:  (2x/wk for 4 weeks or 9 sessions)  Rehab Potential: Good  Plan of Care Agreement: Patient (Patient Goal:  Improve HA frequency/occurence)    Continue to progress with mechanical cervical traction, manual therapy and flexibility/strength ex's as able in order to be able to work around the home and on the job without significant pain/limitation.     Current Problem  Problem List Items Addressed This Visit             ICD-10-CM    Head pain R51.9    Neck pain M54.2       Subjective  Pt reports that he is having a HA this morning behind his eyes but mostly the R.      Precautions  Precautions  Precautions Comment: No Fall Risk.  PMH:  headaches, hx of lumbar discectomy/laminectomy (no fusion), hx of R knee scope.    Pain  Pain Assessment: 0-10  0-10 (Numeric) Pain Score: 4  Pain Location: Head (headache behind eyes)      Treatments:  Ther Ex:  10 min  1 unit  UBE  Lv 1  5 min   Fwd/Backward  Resistive Rowing  Magenta Tubing   2 x 10 reps    Resistive Shoulder Extension   Magenta Tubing   2 x 10 reps    Doorway Anterior Shoulder/Pec Stretch   20 sec hold x 2  Mid Trap/Rhomboid Stretch  20 sec hold x 2  X  B/L UT Stretch   20 sec hold x 2 ea  X  B/L Cervical Rotation Stretch  20 sec hold x 2 ea  HEP instruction with handout given.  Gave pt band set up for home use and handout.        Manual: 14 min   1 unit  STM to neck paraspinals, B UT and scapular muscles with manual cervical traction.  14 min           Modalities:  15 min   1 unit  Mechanical Cervical Traction  30# max / 15# min   15 min           OP EDUCATION:   PT edu pt regarding PT POC/course of treatment and HEP. Pt was given exercise handout as a reference. Pt verbalized good understanding. Handout is in the soft chart.      Goals:  Active       PT Problem       PT Goal 1       Start:  25    Expected End:  25       LTG's:  1) Improve neck and/or UT strength  to >= 5/5 throughout  in order to be able to work around the home/farm without significant limitation.     4-6 weeks      2) Improve cervical AROM from Pimentel 15% B SB and Rot to No Significant Limitation in all limited planes in order to facilitate improved ability to scan with driving or look overhead.     4-6 weeks      3) Improve cervical/Head pain from   5/10 to <= 0-3/10 with activity in order to improve QOL.       4-6 weeks      4) Improve TMD index score by >= 5 points in order to improve QOL..       4-6 weeks      5) Pt will be able to scan with driving, lift/carry objects, perform overhead activity and return to exercise, and work around the home and/or on the job without significant limitation.        4-6 weeks      ST) Pt/caregiver will be I and consistent with HEP with use of handout as needed in order to maximize strength and flexibility.       2-3 weeks

## 2025-02-27 ENCOUNTER — TREATMENT (OUTPATIENT)
Dept: PHYSICAL THERAPY | Facility: CLINIC | Age: 55
End: 2025-02-27
Payer: COMMERCIAL

## 2025-02-27 ENCOUNTER — APPOINTMENT (OUTPATIENT)
Dept: PHYSICAL THERAPY | Facility: CLINIC | Age: 55
End: 2025-02-27
Payer: COMMERCIAL

## 2025-02-27 DIAGNOSIS — R51.9 HEAD PAIN: ICD-10-CM

## 2025-02-27 DIAGNOSIS — M54.2 NECK PAIN: ICD-10-CM

## 2025-02-27 PROCEDURE — 97110 THERAPEUTIC EXERCISES: CPT | Mod: GP,CQ

## 2025-02-27 PROCEDURE — 97012 MECHANICAL TRACTION THERAPY: CPT | Mod: GP,CQ

## 2025-02-27 PROCEDURE — 97140 MANUAL THERAPY 1/> REGIONS: CPT | Mod: GP,CQ

## 2025-02-27 ASSESSMENT — PAIN SCALES - GENERAL: PAINLEVEL_OUTOF10: 3

## 2025-02-27 ASSESSMENT — PAIN - FUNCTIONAL ASSESSMENT: PAIN_FUNCTIONAL_ASSESSMENT: 0-10

## 2025-02-27 NOTE — PROGRESS NOTES
Physical Therapy Treatment    Patient Name: Smith Gray II  MRN: [id number]  Today's Date: 2/27/2025  Time Calculation  Start Time: 0745  Stop Time: 0832  Time Calculation (min): 47 min  PT Therapeutic Procedures Time Entry  Manual Therapy Time Entry: 16  Therapeutic Exercise Time Entry: 12  PT Modalities Time Entry  Mechanical Traction Time Entry: 15    Assessment:   Patient identified by name and date of birth. Patient demonstrated good understanding of HEP with verbal review. He presented with muscle tension with STW with more on R verses L. Added SCM stretch and instructed for HEP, he demonstrated good understanding.     OBJECTIVE     Plan:  OP PT Plan  Treatment/Interventions: Cryotherapy, Education/ Instruction, Dry needling, Electrical stimulation, Hot pack, Manual therapy, Mechanical traction, Therapeutic exercises (Manual Therapy including IASTM and cupping as needed.)  PT Plan: Skilled PT  PT Frequency:  (2x/wk for 4 weeks or 9 sessions)  Rehab Potential: Good  Plan of Care Agreement: Patient (Patient Goal:  Improve HA frequency/occurence)  Continue with progression of postural /cervical strengthening and ROM for increased ease with ADL's.   Current Problem  Problem List Items Addressed This Visit             ICD-10-CM    Head pain R51.9    Neck pain M54.2       Subjective Patient reported compliance with % of the time and verbalized understanding.  Patient reported his decreased Sx lasted throughout the morning after treatment and started to return mid afternoon.     General  Reason for Referral: head pain  Referred By: Maricarmen Parekh DO  General Comment: Visit # 5/9  Precautions  Precautions  Precautions Comment: No Fall Risk.  PMH:  headaches, hx of lumbar discectomy/laminectomy (no fusion), hx of R knee scope.    Pain  Pain Assessment: 0-10  0-10 (Numeric) Pain Score: 3  Pain Location: Head     Treatments:  Therapeutic Exercise  Therapeutic Exercise Performed: Yes  UBE  Lv 1  5 min   "Fwd/Backward  Resistive Rowing  Magenta Tubing 2 x 10 reps    Resistive Shoulder Extension Magenta Tubing 2 x 10 reps    Doorway Anterior Shoulder/Pec Stretch 20 sec hold x 2  SCM stretch x20\" ea side   Mid Trap/Rhomboid Stretch  20 sec hold x 2  X  B/L UT Stretch   20 sec hold x 2 ea  X  B/L Cervical Rotation Stretch  20 sec hold x 2 ea   HEP instruction with handout given.  Gave pt band set up for home use and handout.      Manual:   STM to neck paraspinals, B UT and scapular muscles with manual cervical traction.  14 min         Modalities:    Mechanical Cervical intermittent Traction 30# max / 15# min           OP EDUCATION:   PT edu pt regarding PT POC/course of treatment and HEP. Pt was given exercise handout as a reference. Pt verbalized good understanding. Handout is in the soft chart.    Goals:  Active       PT Problem       PT Goal 1       Start:  25    Expected End:  25       LTG's:  1) Improve neck and/or UT strength  to >= 5/5 throughout  in order to be able to work around the home/farm without significant limitation.     4-6 weeks      2) Improve cervical AROM from Pimentel 15% B SB and Rot to No Significant Limitation in all limited planes in order to facilitate improved ability to scan with driving or look overhead.     4-6 weeks      3) Improve cervical/Head pain from   5/10 to <= 0-3/10 with activity in order to improve QOL.       4-6 weeks      4) Improve TMD index score by >= 5 points in order to improve QOL..       4-6 weeks      5) Pt will be able to scan with driving, lift/carry objects, perform overhead activity and return to exercise, and work around the home and/or on the job without significant limitation.        4-6 weeks      ST) Pt/caregiver will be I and consistent with HEP with use of handout as needed in order to maximize strength and flexibility.       2-3 weeks              "

## 2025-03-03 ENCOUNTER — TREATMENT (OUTPATIENT)
Dept: PHYSICAL THERAPY | Facility: CLINIC | Age: 55
End: 2025-03-03
Payer: COMMERCIAL

## 2025-03-03 DIAGNOSIS — R51.9 HEAD PAIN: ICD-10-CM

## 2025-03-03 DIAGNOSIS — M54.2 NECK PAIN: ICD-10-CM

## 2025-03-03 PROCEDURE — 97140 MANUAL THERAPY 1/> REGIONS: CPT | Mod: GP

## 2025-03-03 PROCEDURE — 97110 THERAPEUTIC EXERCISES: CPT | Mod: GP

## 2025-03-03 ASSESSMENT — PAIN SCALES - GENERAL: PAINLEVEL_OUTOF10: 0 - NO PAIN

## 2025-03-03 ASSESSMENT — PAIN - FUNCTIONAL ASSESSMENT: PAIN_FUNCTIONAL_ASSESSMENT: 0-10

## 2025-03-03 NOTE — PROGRESS NOTES
Physical Therapy Treatment    Patient Name: Smith Gray II  MRN: [id number]  : 1970  Today's Date: 3/3/2025  * No referring provider recorded for this case *  Time Calculation  Start Time: 915  Stop Time: 953  Time Calculation (min): 38 min      PT Therapeutic Procedures Time Entry  Manual Therapy Time Entry: 10  Therapeutic Exercise Time Entry: 20                         General:  General  Reason for Referral: head pain  Referred By: Maricarmen Parekh DO  General Comment: Visit # 6/9  Visit #6    Current Problem  Problem List Items Addressed This Visit             ICD-10-CM    Head pain R51.9    Neck pain M54.2         Assessment:Patient identity confirmed today with name/. ;  ( 0 ) falls since last clinic visit; time taken to review the chart and interview the patient today;  modified/added to clinic/HEP today (largely reduced ex volume today)      Plan:  Treatment/Interventions: Cryotherapy, Education/ Instruction, Dry needling, Electrical stimulation, Hot pack, Manual therapy, Mechanical traction, Therapeutic exercises (Manual Therapy including IASTM and cupping as needed.)  PT Plan: Skilled PT  PT Frequency:  (2x/wk for 4 weeks or 9 sessions)  Rehab Potential: Good  Plan of Care Agreement: Patient (Patient Goal:  Improve HA frequency/occurence)  Continue with treatments consistent with C/C sx centralization;  assess HEP effect wit modifications    Subjective: I have  0 /10 pain right now.   I was worse over the weekend and do not know why.  I do feel good after the sessions but the positive effect is temporary.  It seems like sx have gotten worse over the last 3 mths.      Precautions  Precautions  Precautions Comment: No Fall Risk.  PMH:  headaches, hx of lumbar discectomy/laminectomy (no fusion), hx of R knee scope.      Pain  Pain Assessment: 0-10  0-10 (Numeric) Pain Score: 0 - No pain  Pain Location: Head    Objective        There ex:___20__min  UBE  Lv 2  2x3'  Resistive Rowing  pink Tubing 2 x  "10 reps    Resistive Shoulder pink Tubing 2 x 10 reps    Stdg shrugs 6# ea 2x10  After manual-  Cerv retractions at wall x10  Head on wall 1'  NOT TODAY  Doorway Anterior Shoulder/Pec Stretch 20 sec hold x 2  SCM stretch x20\" ea side   Mid Trap/Rhomboid Stretch  20 sec hold x 2  X  B/L UT Stretch   20 sec hold x 2 ea  X  B/L Cervical Rotation Stretch  20 sec hold x 2 ea   HEP instruction with handout given.  Gave pt band set up for home use and handout.      Manual: 10'  C1 wiggle 5x 2 count hold ea  Man TX/mass; SOR   Active nods x10  STM to neck paraspinals, B UT and scapular muscles with manual cervical traction.  X      Modalities:  X  Mechanical Cervical intermittent Traction 30# max / 15# min           OP EDUCATION:   PT edu pt regarding PT POC/course of treatment and HEP. Pt was given exercise handout as a reference. Pt verbalized good understanding. Handout is in the soft chart.       OP EDUCATION:  Access Code: [email or web address]  URL: [email or web address]  Date: 03/03/2025  Prepared by: Chauncey San    Exercises  - Supine Isometric Neck Rotation   - Supine Chin Tuck   - Seated High Shoulder Row with Anchored Resistance   - Standing Shoulder Shrugs   - Cervical Retraction   - Cervical Retraction at Wall   - Supine Passive Cervical Extension     Goals:  Active       PT Problem       PT Goal 1       Start:  02/12/25    Expected End:  03/26/25       LTG's:  1) Improve neck and/or UT strength  to >= 5/5 throughout  in order to be able to work around the home/farm without significant limitation.     4-6 weeks      2) Improve cervical AROM from Pimentel 15% B SB and Rot to No Significant Limitation in all limited planes in order to facilitate improved ability to scan with driving or look overhead.     4-6 weeks      3) Improve cervical/Head pain from   5/10 to <= 0-3/10 with activity in order to improve QOL.       4-6 weeks      4) Improve TMD index score by >= 5 points in order to improve QOL..       " 4-6 weeks      5) Pt will be able to scan with driving, lift/carry objects, perform overhead activity and return to exercise, and work around the home and/or on the job without significant limitation.        4-6 weeks      ST) Pt/caregiver will be I and consistent with HEP with use of handout as needed in order to maximize strength and flexibility.       2-3 weeks

## 2025-03-06 ENCOUNTER — TREATMENT (OUTPATIENT)
Dept: PHYSICAL THERAPY | Facility: CLINIC | Age: 55
End: 2025-03-06
Payer: COMMERCIAL

## 2025-03-06 DIAGNOSIS — R51.9 HEAD PAIN: ICD-10-CM

## 2025-03-06 DIAGNOSIS — M54.2 NECK PAIN: ICD-10-CM

## 2025-03-06 PROCEDURE — 97140 MANUAL THERAPY 1/> REGIONS: CPT | Mod: GP

## 2025-03-06 PROCEDURE — 97110 THERAPEUTIC EXERCISES: CPT | Mod: GP

## 2025-03-06 ASSESSMENT — PAIN SCALES - GENERAL: PAINLEVEL_OUTOF10: 0 - NO PAIN

## 2025-03-06 ASSESSMENT — PAIN - FUNCTIONAL ASSESSMENT: PAIN_FUNCTIONAL_ASSESSMENT: 0-10

## 2025-03-06 NOTE — PROGRESS NOTES
"Physical Therapy Treatment    Patient Name: Smith Gray II  MRN: [id number]  : 1970   * No referring provider recorded for this case *  Today's Date: 3/6/2025  Time Calculation  Start Time: 756  Stop Time: 0837  Time Calculation (min): 41 min  PT Therapeutic Procedures Time Entry  Manual Therapy Time Entry: 10  Therapeutic Exercise Time Entry: 29           General  Reason for Referral: head pain  Referred By: Maricarmen Parekh DO  General Comment: Visit # 7/9  Visit #7     Current Problem  Problem List Items Addressed This Visit             ICD-10-CM       Musculoskeletal and Injuries    Neck pain M54.2       Neuro    Head pain R51.9            Subjective   Pt reports no head or neck pain today and denies any headache. Pt reports that he started taking gabapentin yesterday. Pt has been able to complete HEP without much issue at this time. Pt says that the neck and headaches will come on randomly.     Pt. Reports compliance with HEP.    Precautions  Precautions  Precautions Comment: No Fall Risk.  PMH:  headaches, hx of lumbar discectomy/laminectomy (no fusion), hx of R knee scope.    Pain  Pain Assessment: 0-10  0-10 (Numeric) Pain Score: 0 - No pain  Pain Location: Head    Objective      Pt with increased tissue tension along SAVAGE suboccipitals.       Treatments:    UBE  Lv 2  2x3'  Resistive Rowing  pink Tubing 2 x 15 reps  (p reps)  Resistive Shoulder pink Tubing 2 x 15 reps  (p reps)   Stdg shrugs 6# ea 2x15 (p reps)  Seated UT stretch R/L 2x30\"  Seated Levator stretch R/L 2x30\" N  After manual-  Cerv retractions at wall x10 X  Head on wall 1' X  Supine chin tuck with neck flexion 3\" hold 2x10 N  R/L Open books x15 each N  Prone scap retractions R/L 2x10 N  Supine SA punches with 3# R/L 2x10 N  NOT TODAY  Doorway Anterior Shoulder/Pec Stretch 20 sec hold x 2  SCM stretch x20\" ea side   Mid Trap/Rhomboid Stretch  20 sec hold x 2  X  B/L UT Stretch   20 sec hold x 2 ea  X  B/L Cervical Rotation Stretch  20 " sec hold x 2 ea   HEP instruction with handout given.  Gave pt band set up for home use and handout.      Manual:   C1 wiggle 5x 2 count hold ea X  Man TX/mass; SOR   Active nods x10 X  STM to neck paraspinals, B UT and scapular muscles with manual cervical traction.  X      Modalities:  X  Mechanical Cervical intermittent Traction 30# max / 15# min            Current HEP:   Access Code: [email or web address]  URL: [email or web address]  Date: 03/03/2025  Prepared by: Chauncey San    Exercises  - Supine Isometric Neck Rotation   - Supine Chin Tuck   - Seated High Shoulder Row with Anchored Resistance   - Standing Shoulder Shrugs   - Cervical Retraction   - Cervical Retraction at Wall   - Supine Passive Cervical Extension     Assessment:     Pt tolerated session well with no reported increase in any neck pain or headaches with progressed and new exercises. Pt demonstrated quick scapular fatigue with prone scapular rows and with SA punches. Pt will benefit from continued treatment.     Plan:  OP PT Plan  Treatment/Interventions: Cryotherapy, Education/ Instruction, Dry needling, Electrical stimulation, Hot pack, Manual therapy, Mechanical traction, Therapeutic exercises (Manual Therapy including IASTM and cupping as needed.)  PT Plan: Skilled PT  PT Frequency:  (2x/wk for 4 weeks or 9 sessions)  Rehab Potential: Good  Plan of Care Agreement: Patient (Patient Goal: Improve HA frequency/occurence)    Goals:  Active       PT Problem       PT Goal 1       Start:  02/12/25    Expected End:  03/26/25       LTG's:  1) Improve neck and/or UT strength  to >= 5/5 throughout  in order to be able to work around the home/farm without significant limitation.     4-6 weeks      2) Improve cervical AROM from Pimentel 15% B SB and Rot to No Significant Limitation in all limited planes in order to facilitate improved ability to scan with driving or look overhead.     4-6 weeks      3) Improve cervical/Head pain from   5/10 to <=  0-3/10 with activity in order to improve QOL.       4-6 weeks      4) Improve TMD index score by >= 5 points in order to improve QOL..       4-6 weeks      5) Pt will be able to scan with driving, lift/carry objects, perform overhead activity and return to exercise, and work around the home and/or on the job without significant limitation.        4-6 weeks      ST) Pt/caregiver will be I and consistent with HEP with use of handout as needed in order to maximize strength and flexibility.       2-3 weeks

## 2025-03-10 ENCOUNTER — TREATMENT (OUTPATIENT)
Dept: PHYSICAL THERAPY | Facility: CLINIC | Age: 55
End: 2025-03-10
Payer: COMMERCIAL

## 2025-03-10 DIAGNOSIS — M54.2 NECK PAIN: ICD-10-CM

## 2025-03-10 DIAGNOSIS — R51.9 HEAD PAIN: ICD-10-CM

## 2025-03-10 PROCEDURE — 97110 THERAPEUTIC EXERCISES: CPT | Mod: GP,CQ

## 2025-03-10 PROCEDURE — 97140 MANUAL THERAPY 1/> REGIONS: CPT | Mod: GP,CQ

## 2025-03-10 ASSESSMENT — PAIN SCALES - GENERAL: PAINLEVEL_OUTOF10: 2

## 2025-03-10 ASSESSMENT — PAIN - FUNCTIONAL ASSESSMENT: PAIN_FUNCTIONAL_ASSESSMENT: 0-10

## 2025-03-10 NOTE — PROGRESS NOTES
"Physical Therapy Treatment    Patient Name: Smith Grya II  MRN: [id number]  Today's Date: 3/10/2025  Time Calculation  Start Time: 0745  Stop Time: 0829  Time Calculation (min): 44 min  PT Therapeutic Procedures Time Entry  Manual Therapy Time Entry: 13  Therapeutic Exercise Time Entry: 29       Assessment:   Patient identified by name and date of birth. Reviewed HEP and provided new handouts he verbalized good understanding. He demonstrated good understanding of exercises with cues at times for proper form and technique. He presented with muscle tension/restrictions in B UT's and SCM along with suboccipitals.     OBJECTIVE     Plan:  OP PT Plan  Treatment/Interventions: Cryotherapy, Education/ Instruction, Dry needling, Electrical stimulation, Hot pack, Manual therapy, Mechanical traction, Therapeutic exercises (Manual Therapy including IASTM and cupping as needed.)  PT Plan: Skilled PT  PT Frequency:  (2x/wk for 4 weeks or 9 sessions)  Rehab Potential: Good  Plan of Care Agreement: Patient (Patient Goal: Improve HA frequency/occurence)  Patient has re-check with PT next visit.   Current Problem  Problem List Items Addressed This Visit             ICD-10-CM    Head pain R51.9    Neck pain M54.2       Subjective Patient reported compliance with % of the time and verbalized understanding.  Patient stated \"I feel good\" after treatment.   General  Reason for Referral: head pain  Referred By: Maricarmen Parekh DO  General Comment: Visit # 8/9  Precautions  Precautions  Precautions Comment: No Fall Risk.  PMH:  headaches, hx of lumbar discectomy/laminectomy (no fusion), hx of R knee scope.    Pain  Pain Assessment: 0-10  0-10 (Numeric) Pain Score: 2  Pain Location: Head     Treatments:  Therapeutic Exercise  Therapeutic Exercise Performed: Yes  UBE  Lv 2  2x3'  Resistive Rowing magenta Tubing 2 x 10 reps  (p)  Resistive Shoulder magenta Tubing 2 x 15 reps  (p)   Stdg shrugs 6# ea 2x15   Seated UT stretch R/L " "2x30\"(X)  Seated Levator stretch R/L 2x30\" (X)  Seated B ER 2 x 10 green band 2 x 10 (N)  Supine chin tuck with neck flexion 3\" hold 2x10 N  R/L Open books x15 each 3\" Hold (P)  Prone scap retractions R/L 2x10   Prone B scap retraction with Ue's down to sides (I's) (N)  Supine SA punches with 3# B 2x10     NOT TODAY  Cerv retractions at wall x10 X  Head on wall 1' X  Doorway Anterior Shoulder/Pec Stretch 20 sec hold x 2  SCM stretch x20\" ea side   Mid Trap/Rhomboid Stretch  20 sec hold x 2  X  B/L UT Stretch   20 sec hold x 2 ea  X  B/L Cervical Rotation Stretch  20 sec hold x 2 ea       Manual:   C1 wiggle 5x 2 count hold ea   Man TX/mass; SOR   Active nods x10   STM to neck paraspinals, B UT and scapular muscles with manual cervical traction.        Modalities: (X)  Mechanical Cervical intermittent Traction 30# max / 15# min          Current HEP:   Access Code: [email or web address]  URL: [email or web address]  Date: 03/10/2025  Prepared by: Mary Ellen Hidalgo    Exercises  - Prone Scapular Slide with Shoulder Extension  - 1 x daily - 7 x weekly - 2 sets - 10 reps  - Prone Scapular Retraction and Row  - 1 x daily - 7 x weekly - 2 sets - 10 reps  - Standing Shoulder External Rotation with Resistance  - 1 x daily - 7 x weekly - 2 sets - 10 reps  - Sidelying Open Book  - 1 x daily - 7 x weekly - 2 sets - 10 reps - 3-10 hold  Access Code: [email or web address]  URL: [email or web address]  Date: 03/03/2025  Prepared by: Chauncey San     Exercises  - Supine Isometric Neck Rotation   - Supine Chin Tuck   - Seated High Shoulder Row with Anchored Resistance   - Standing Shoulder Shrugs   - Cervical Retraction   - Cervical Retraction at Wall   - Supine Passive Cervical Extension     Goals:  Active       PT Problem       PT Goal 1       Start:  02/12/25    Expected End:  03/26/25       LTG's:  1) Improve neck and/or UT strength  to >= 5/5 throughout  in order to be able to work around the home/farm without " significant limitation.     4-6 weeks      2) Improve cervical AROM from Pimentel 15% B SB and Rot to No Significant Limitation in all limited planes in order to facilitate improved ability to scan with driving or look overhead.     4-6 weeks      3) Improve cervical/Head pain from   5/10 to <= 0-3/10 with activity in order to improve QOL.       4-6 weeks      4) Improve TMD index score by >= 5 points in order to improve QOL..       4-6 weeks      5) Pt will be able to scan with driving, lift/carry objects, perform overhead activity and return to exercise, and work around the home and/or on the job without significant limitation.        4-6 weeks      ST) Pt/caregiver will be I and consistent with HEP with use of handout as needed in order to maximize strength and flexibility.       2-3 weeks

## 2025-03-13 ENCOUNTER — TREATMENT (OUTPATIENT)
Dept: PHYSICAL THERAPY | Facility: CLINIC | Age: 55
End: 2025-03-13
Payer: COMMERCIAL

## 2025-03-13 DIAGNOSIS — R51.9 HEAD PAIN: ICD-10-CM

## 2025-03-13 DIAGNOSIS — M54.2 NECK PAIN: ICD-10-CM

## 2025-03-13 PROCEDURE — 97110 THERAPEUTIC EXERCISES: CPT | Mod: GP

## 2025-03-13 ASSESSMENT — PAIN SCALES - GENERAL: PAINLEVEL_OUTOF10: 1

## 2025-03-13 ASSESSMENT — PAIN - FUNCTIONAL ASSESSMENT: PAIN_FUNCTIONAL_ASSESSMENT: 0-10

## 2025-03-13 NOTE — PROGRESS NOTES
"Physical Therapy Treatment    Patient Name: Smith Gray II  MRN: [id number]  : 1970  Today's Date: 3/13/2025  * No referring provider recorded for this case *  Time Calculation  Start Time: 816  Stop Time: 845  Time Calculation (min): 29 min      PT Therapeutic Procedures Time Entry  Therapeutic Exercise Time Entry: 27                         General:  General  Reason for Referral: head pain  Referred By: Maricarmen Parekh DO  General Comment: Visit # 9/9  Visit #9    Current Problem  Problem List Items Addressed This Visit             ICD-10-CM    Head pain R51.9    Neck pain M54.2         Assessment:Patient identity confirmed today with name/. ;  (0  ) falls since last clinic visit; see goals; modified/added to clinic/HEP today;       Plan:     This patient agrees to discharge to ongoing HEP and home management with ( good ) progress achieved.        Subjective: I have  1 /10 pain right now.   I have noticed more improvements since starting the medication.      Precautions  Precautions  Precautions Comment: No Fall Risk.  PMH:  headaches, hx of lumbar discectomy/laminectomy (no fusion), hx of R knee scope.      Pain  Pain Assessment: 0-10  0-10 (Numeric) Pain Score: 1  Pain Location: Head (R temple)    Objective  See goals; good C/C sx reduction and ADL tolerance improvements      There ex:__27___min  Goals reviewed, surveyed and/or updated  Reviewed and/or modified ongoing HEP today.  NOT TODAY  UBE  Lv 2  2x3'  Resistive Rowing magenta Tubing 2 x 10 reps  (p)  Resistive Shoulder magenta Tubing 2 x 15 reps  (p)   Stdg shrugs 6# ea 2x15   Seated UT stretch R/L 2x30\"(X)  Seated Levator stretch R/L 2x30\" (X)  Seated B ER 2 x 10 green band 2 x 10 (N)  Supine chin tuck with neck flexion 3\" hold 2x10 N  R/L Open books x15 each 3\" Hold (P)  Prone scap retractions R/L 2x10   Prone B scap retraction with Ue's down to sides (I's) (N)  Supine SA punches with 3# B 2x10   Cerv retractions at wall x10 X  Head on wall " "1' X  Doorway Anterior Shoulder/Pec Stretch 20 sec hold x 2  SCM stretch x20\" ea side   Mid Trap/Rhomboid Stretch  20 sec hold x 2  X  B/L UT Stretch   20 sec hold x 2 ea  X  B/L Cervical Rotation Stretch  20 sec hold x 2 ea       Manual:   C1 wiggle 5x 2 count hold ea   Man TX/mass; SOR   Active nods x10   STM to neck paraspinals, B UT and scapular muscles with manual cervical traction.        Modalities: (X)  Mechanical Cervical intermittent Traction 30# max / 15# min          Current HEP:   Access Code: [email or web address]  URL: [email or web address]  Date: 03/10/2025  Prepared by: Mary Ellen Hidalgo     Exercises  - Prone Scapular Slide with Shoulder Extension  - 1 x daily - 7 x weekly - 2 sets - 10 reps  - Prone Scapular Retraction and Row  - 1 x daily - 7 x weekly - 2 sets - 10 reps  - Standing Shoulder External Rotation with Resistance  - 1 x daily - 7 x weekly - 2 sets - 10 reps  - Sidelying Open Book  - 1 x daily - 7 x weekly - 2 sets - 10 reps - 3-10 hold  Access Code: [email or web address]  URL: [email or web address]  Date: 03/03/2025  Prepared by: Chauncey San     Exercises  - Supine Isometric Neck Rotation   - Supine Chin Tuck   - Seated High Shoulder Row with Anchored Resistance   - Standing Shoulder Shrugs   - Cervical Retraction   - Cervical Retraction at Wall   - Supine Passive Cervical Extension        OP EDUCATION:  Access Code: [email or web address]  URL: [email or web address]  Date: 03/13/2025  Prepared by: Chauncey Luecht    Exercises  - Standing Shoulder Shrugs   - Supine Chin Tuck   - Standing Cervical Rotation AROM   - Cervical Retraction   - Seated Upper Trapezius Stretch   - Cervical Retraction at Wall   - Supine Passive Cervical Extension     Goals:  Active       PT Problem       PT Goal 1       Start:  02/12/25    Expected End:  03/26/25       LTG's:  1) Improve neck and/or UT strength  to >= 5/5 throughout  in order to be able to work around the home/farm " without significant limitation.     4-6 weeks; notes greater farm ADL improvements;  cerv muscle strength; 3/13/25; MET      2) Improve cervical AROM from Pimentel 15% B SB and Rot to No Significant Limitation in all limited planes in order to facilitate improved ability to scan with driving or look overhead.     4-6 weeks noted ADL not any issue at present; symmetrical B active rot/Sbs today; 3/13/25; MET      3) Improve cervical/Head pain from   5/10 to <= 0-3/10 with activity in order to improve QOL.       4-6 weeks 4/10 max DL sx within the last 5 days; 3/13/25; PARTIALLY MET       4) Improve TMD index score by >= 5 points in order to improve QOL..       4-6 weeks TMD score 3 as of 3/13/25; MET      5) Pt will be able to scan with driving, lift/carry objects, perform overhead activity and return to exercise, and work around the home and/or on the job without significant limitation.        4-6 weeks notes ADL improvements with no signficant limitations; 3/13/25; MET      ST) Pt/caregiver will be I and consistent with HEP with use of handout as needed in order to maximize strength and flexibility.       2-3 weeks

## 2025-07-07 ENCOUNTER — APPOINTMENT (OUTPATIENT)
Dept: PRIMARY CARE | Facility: CLINIC | Age: 55
End: 2025-07-07
Payer: COMMERCIAL

## 2025-07-07 VITALS
HEART RATE: 82 BPM | DIASTOLIC BLOOD PRESSURE: 80 MMHG | HEIGHT: 73 IN | WEIGHT: 208.8 LBS | SYSTOLIC BLOOD PRESSURE: 122 MMHG | OXYGEN SATURATION: 99 % | BODY MASS INDEX: 27.67 KG/M2

## 2025-07-07 DIAGNOSIS — R00.2 PALPITATIONS: ICD-10-CM

## 2025-07-07 DIAGNOSIS — K21.9 GASTROESOPHAGEAL REFLUX DISEASE WITHOUT ESOPHAGITIS: ICD-10-CM

## 2025-07-07 DIAGNOSIS — Z12.5 SCREENING FOR PROSTATE CANCER: ICD-10-CM

## 2025-07-07 DIAGNOSIS — Z00.00 WELL ADULT EXAM: Primary | ICD-10-CM

## 2025-07-07 DIAGNOSIS — E78.2 HYPERLIPEMIA, MIXED: ICD-10-CM

## 2025-07-07 DIAGNOSIS — R53.83 FATIGUE, UNSPECIFIED TYPE: ICD-10-CM

## 2025-07-07 PROCEDURE — 3008F BODY MASS INDEX DOCD: CPT | Performed by: FAMILY MEDICINE

## 2025-07-07 PROCEDURE — 99396 PREV VISIT EST AGE 40-64: CPT | Performed by: FAMILY MEDICINE

## 2025-07-07 RX ORDER — PANTOPRAZOLE SODIUM 40 MG/1
40 TABLET, DELAYED RELEASE ORAL DAILY
Qty: 90 TABLET | Refills: 3 | Status: SHIPPED | OUTPATIENT
Start: 2025-07-07 | End: 2026-07-07

## 2025-07-07 RX ORDER — GABAPENTIN 100 MG/1
100 CAPSULE ORAL
COMMUNITY
End: 2025-07-07 | Stop reason: DRUGHIGH

## 2025-07-07 RX ORDER — GABAPENTIN 100 MG/1
CAPSULE ORAL
Qty: 90 CAPSULE | Refills: 11 | Status: SHIPPED | OUTPATIENT
Start: 2025-07-07

## 2025-07-07 ASSESSMENT — ENCOUNTER SYMPTOMS
CONSTIPATION: 0
ADENOPATHY: 0
DEPRESSION: 0
SHORTNESS OF BREATH: 0
UNEXPECTED WEIGHT CHANGE: 0
COUGH: 0
TROUBLE SWALLOWING: 0
DIFFICULTY URINATING: 0
VOMITING: 0
PALPITATIONS: 0
ACTIVITY CHANGE: 0
WHEEZING: 0
LOSS OF SENSATION IN FEET: 0
FATIGUE: 0
LIGHT-HEADEDNESS: 0
NAUSEA: 0
DIZZINESS: 0
ABDOMINAL PAIN: 0
ABDOMINAL DISTENTION: 0
APPETITE CHANGE: 0
NUMBNESS: 0
RHINORRHEA: 0
OCCASIONAL FEELINGS OF UNSTEADINESS: 0
ARTHRALGIAS: 0
HEADACHES: 0
DIARRHEA: 0

## 2025-07-07 ASSESSMENT — PATIENT HEALTH QUESTIONNAIRE - PHQ9
1. LITTLE INTEREST OR PLEASURE IN DOING THINGS: NOT AT ALL
2. FEELING DOWN, DEPRESSED OR HOPELESS: NOT AT ALL
SUM OF ALL RESPONSES TO PHQ9 QUESTIONS 1 AND 2: 0

## 2025-07-07 NOTE — PROGRESS NOTES
"Subjective   Patient ID: Speedy Gray II is a 55 y.o. male who presents for Annual Exam (Annual exam with [facility] in hand).    HPI   Had consulted neurology for headaches, done some PT, started gabapentin and to see pain medicine for injections  No headache, chest pain, shortness of breath, dizziness, lightheadedness, or edema  Taking PPI daily without breakthrough symptoms.  Reviewed dietary, caffeine, tobacco, alcohol, and NSAID use.  No dyspepsia, dysphagia, reflux, melena, or abdominal pain.  Notices if misses medicine for a few days  Stopped nortriptyline, stretching  Regular exercise 3 times a week  Seen dermatology annually    Review of Systems   Constitutional:  Negative for activity change, appetite change, fatigue and unexpected weight change.   HENT:  Negative for ear pain, nosebleeds, rhinorrhea, sneezing and trouble swallowing.    Respiratory:  Negative for cough, shortness of breath and wheezing.    Cardiovascular:  Negative for chest pain, palpitations and leg swelling.   Gastrointestinal:  Negative for abdominal distention, abdominal pain, constipation, diarrhea, nausea and vomiting.   Genitourinary:  Negative for difficulty urinating.   Musculoskeletal:  Negative for arthralgias.   Skin:  Negative for rash.   Neurological:  Negative for dizziness, light-headedness, numbness and headaches.   Hematological:  Negative for adenopathy.   Psychiatric/Behavioral:  Negative for behavioral problems.    All other systems reviewed and are negative.      Objective   /80   Pulse 82   Ht 1.854 m (6' 1\")   Wt 94.7 kg (208 lb 12.8 oz)   SpO2 99%   BMI 27.55 kg/m²     Physical Exam  Vitals and nursing note reviewed.   Constitutional:       General: He is not in acute distress.     Appearance: Normal appearance. He is not toxic-appearing.   HENT:      Head: Normocephalic and atraumatic.      Right Ear: Tympanic membrane, ear canal and external ear normal.      Left Ear: Tympanic membrane, " ear canal and external ear normal.      Nose: Nose normal.      Mouth/Throat:      Mouth: Mucous membranes are moist.      Pharynx: Oropharynx is clear.   Eyes:      Extraocular Movements: Extraocular movements intact.      Conjunctiva/sclera: Conjunctivae normal.      Pupils: Pupils are equal, round, and reactive to light.   Cardiovascular:      Rate and Rhythm: Normal rate and regular rhythm.      Pulses: Normal pulses.      Heart sounds: Normal heart sounds.   Pulmonary:      Effort: Pulmonary effort is normal.      Breath sounds: Normal breath sounds.   Abdominal:      General: Abdomen is flat. Bowel sounds are normal.      Palpations: Abdomen is soft.   Musculoskeletal:      Cervical back: Normal range of motion and neck supple.   Skin:     General: Skin is warm and dry.      Capillary Refill: Capillary refill takes less than 2 seconds.   Neurological:      General: No focal deficit present.      Mental Status: He is alert and oriented to person, place, and time. Mental status is at baseline.   Psychiatric:         Mood and Affect: Mood normal.         Behavior: Behavior normal.         Assessment/Plan   Problem List Items Addressed This Visit           ICD-10-CM    Fatigue R53.83    Relevant Orders    Follow Up In Primary Care - Established    Comprehensive Metabolic Panel    GERD (gastroesophageal reflux disease) K21.9    PPI very effective, post EGD with negative biopsies.         Relevant Medications    pantoprazole (ProtoNix) 40 mg EC tablet    Other Relevant Orders    Follow Up In Primary Care - Established    Hyperlipemia, mixed E78.2    Mild elevation in LDL cholesterol, CT cardiac calcium score ordered by cardiology reveals a score in the LAD of only 3.  Patient very active, advised again about diet and exercise, no change with medication follow-up again in 1 year.         Relevant Orders    Follow Up In Primary Care - Established    Lipid Panel    Palpitations R00.2    Relevant Orders    Follow Up In  Primary Care - Established    Comprehensive Metabolic Panel     Other Visit Diagnoses         Codes      Well adult exam    -  Primary Z00.00    Colonoscopy up-to-date, check PSA testing today, encouraged about diet and exercise.     Relevant Orders    Follow Up In Primary Care - Established      Screening for prostate cancer     Z12.5    Relevant Orders    Follow Up In Primary Care - Established    Prostate Specific Antigen, Screen

## 2025-07-08 LAB
ALBUMIN SERPL-MCNC: 4.4 G/DL (ref 3.6–5.1)
ALP SERPL-CCNC: 95 U/L (ref 35–144)
ALT SERPL-CCNC: 38 U/L (ref 9–46)
ANION GAP SERPL CALCULATED.4IONS-SCNC: 6 MMOL/L (CALC) (ref 7–17)
AST SERPL-CCNC: 33 U/L (ref 10–35)
BILIRUB SERPL-MCNC: 0.7 MG/DL (ref 0.2–1.2)
BUN SERPL-MCNC: 19 MG/DL (ref 7–25)
CALCIUM SERPL-MCNC: 9.2 MG/DL (ref 8.6–10.3)
CHLORIDE SERPL-SCNC: 106 MMOL/L (ref 98–110)
CHOLEST SERPL-MCNC: 178 MG/DL
CHOLEST/HDLC SERPL: 4.2 (CALC)
CO2 SERPL-SCNC: 29 MMOL/L (ref 20–32)
CREAT SERPL-MCNC: 0.99 MG/DL (ref 0.7–1.3)
EGFRCR SERPLBLD CKD-EPI 2021: 90 ML/MIN/1.73M2
GLUCOSE SERPL-MCNC: 91 MG/DL (ref 65–139)
HDLC SERPL-MCNC: 42 MG/DL
LDLC SERPL CALC-MCNC: 115 MG/DL (CALC)
NONHDLC SERPL-MCNC: 136 MG/DL (CALC)
POTASSIUM SERPL-SCNC: 4.4 MMOL/L (ref 3.5–5.3)
PROT SERPL-MCNC: 7.1 G/DL (ref 6.1–8.1)
PSA SERPL-MCNC: 0.87 NG/ML
SODIUM SERPL-SCNC: 141 MMOL/L (ref 135–146)
TRIGL SERPL-MCNC: 100 MG/DL

## 2026-07-08 ENCOUNTER — APPOINTMENT (OUTPATIENT)
Age: 56
End: 2026-07-08
Payer: COMMERCIAL